# Patient Record
Sex: MALE | Race: OTHER | Employment: FULL TIME | URBAN - METROPOLITAN AREA
[De-identification: names, ages, dates, MRNs, and addresses within clinical notes are randomized per-mention and may not be internally consistent; named-entity substitution may affect disease eponyms.]

---

## 2019-06-21 ENCOUNTER — HOSPITAL ENCOUNTER (EMERGENCY)
Facility: CLINIC | Age: 34
Discharge: HOME | End: 2019-06-21
Attending: EMERGENCY MEDICINE
Payer: MEDICARE

## 2019-06-21 VITALS
WEIGHT: 173 LBS | OXYGEN SATURATION: 98 % | TEMPERATURE: 98 F | HEIGHT: 71 IN | SYSTOLIC BLOOD PRESSURE: 133 MMHG | RESPIRATION RATE: 20 BRPM | HEART RATE: 92 BPM | DIASTOLIC BLOOD PRESSURE: 86 MMHG | BODY MASS INDEX: 24.22 KG/M2

## 2019-06-21 DIAGNOSIS — L02.419 CELLULITIS AND ABSCESS OF LEG: Primary | ICD-10-CM

## 2019-06-21 DIAGNOSIS — L03.119 CELLULITIS AND ABSCESS OF LEG: Primary | ICD-10-CM

## 2019-06-21 PROCEDURE — 99282 EMERGENCY DEPT VISIT SF MDM: CPT

## 2019-06-21 PROCEDURE — 10060 I&D ABSCESS SIMPLE/SINGLE: CPT

## 2019-06-21 RX ORDER — SULFAMETHOXAZOLE AND TRIMETHOPRIM 800; 160 MG/1; MG/1
1 TABLET ORAL 2 TIMES DAILY
Qty: 20 TABLET | Refills: 0 | Status: SHIPPED | OUTPATIENT
Start: 2019-06-21 | End: 2019-07-01

## 2019-06-21 ASSESSMENT — PAIN DESCRIPTION - ORIENTATION: ORIENTATION: LEFT

## 2019-06-21 ASSESSMENT — PAIN DESCRIPTION - DESCRIPTORS: DESCRIPTORS: PENETRATING;SHOOTING

## 2019-06-21 ASSESSMENT — PAIN DESCRIPTION - PAIN TYPE: TYPE: ACUTE PAIN

## 2019-06-21 ASSESSMENT — PAIN DESCRIPTION - FREQUENCY: FREQUENCY: CONTINUOUS

## 2019-06-21 ASSESSMENT — ENCOUNTER SYMPTOMS: BACK PAIN: 0

## 2019-06-21 ASSESSMENT — PAIN SCALES - GENERAL: PAINLEVEL_OUTOF10: 8

## 2019-06-21 ASSESSMENT — PAIN DESCRIPTION - LOCATION: LOCATION: BUTTOCKS

## 2019-06-21 NOTE — ED NOTES
Dr Kenyetta Pino at bedside for I & D of left lower inner buttock fold abscess. Abscess approx the size of a quarter in circumference.       Era Lopez RN  06/21/19 8898

## 2019-06-21 NOTE — ED NOTES
Dr Porter Boots at bedside to evaluate. C/o abscess to left inner buttock fold 3-4 days. Denies drainage, states hx of same.       Kelby Hernandez RN  06/21/19 4848

## 2019-06-21 NOTE — ED PROVIDER NOTES
[facility] ED  [address] [facility] [address]  Phone: [phone] ED  eMERGENCY dEPARTMENT eNCOUnter      Pt Name: Ty Boudreaux  MRN: [id number]  [place] 1985  Date of evaluation: 6/21/2019  Provider: Jimy Willard DO    CHIEF COMPLAINT       Chief Complaint   Patient presents with    Abscess     left buttocks 3-4 days, denies drainage. denies fever. HISTORY OF PRESENT ILLNESS   (Location/Symptom, Timing/Onset,Context/Setting, Quality, Duration, Modifying Factors, Severity)  Note limiting factors. Ty Boudreaux is a 27 y.o. male who presents to the emergency department for the evaluation of an abscess on his left buttocks. Patient states this started about 3 or 4 days ago, it has been getting a little bit more swollen and a little bit more painful. He does have a history of an abscess in the past.  Patient is not diabetic. Patient denies fever or chills. Patient denies bowel or bladder incontinence     Nursing Notes were reviewed. REVIEW OF SYSTEMS    (2-9systems for level 4, 10 or more for level 5)     Review of Systems   Constitutional: Negative for fever. Genitourinary: Negative for dysuria. Musculoskeletal: Negative for back pain. Skin:        Lump on buttocks       Except asnoted above the remainder of the review of systems was reviewed and negative. PAST MEDICAL HISTORY   No past medical history on file. SURGICAL HISTORY     No past surgical history on file. CURRENT MEDICATIONS     Previous Medications    No medications on file       ALLERGIES     Motrin [ibuprofen]    FAMILY HISTORY     No family history on file.        SOCIAL HISTORY       Social History     Socioeconomic History    Marital status:      Spouse name: Not on file    Number of children: Not on file    Years of education: Not on file    Highest education level: Not on file   Occupational History    Not on file   Social Needs    Financial resource strain: Not on file    Food insecurity:     Worry: Not on file     Inability: Not on file    Transportation needs:     Medical: Not on file     Non-medical: Not on file   Tobacco Use    Smoking status: Current Every Day Smoker     Packs/day: 0.50     Types: Cigarettes   Substance and Sexual Activity    Alcohol use: No    Drug use: Never    Sexual activity: Not on file   Lifestyle    Physical activity:     Days per week: Not on file     Minutes per session: Not on file    Stress: Not on file   Relationships    Social connections:     Talks on phone: Not on file     Gets together: Not on file     Attends Mormon service: Not on file     Active member of club or organization: Not on file     Attends meetings of clubs or organizations: Not on file     Relationship status: Not on file    Intimate partner violence:     Fear of current or ex partner: Not on file     Emotionally abused: Not on file     Physically abused: Not on file     Forced sexual activity: Not on file   Other Topics Concern    Not on file   Social History Narrative    Not on file       SCREENINGS             PHYSICAL EXAM    (up to 7 for level 4, 8 or more for level 5)     ED Triage Vitals [06/21/19 1010]   BP Temp Temp Source Pulse Resp SpO2 Height Weight   133/86 98 °F (36.7 °C) Oral 92 20 98 % 5' 11\" (1.803 m) 173 lb (78.5 kg)       Physical Exam   Constitutional: He is oriented to person, place, and time. He appears well-developed and well-nourished. No distress. HENT:   Mouth/Throat: Oropharynx is clear and moist.   Eyes: Conjunctivae are normal. Right eye exhibits no discharge. Left eye exhibits no discharge. Neck: No tracheal deviation present. Cardiovascular: Normal rate, regular rhythm, normal heart sounds and intact distal pulses. No murmur heard. Pulmonary/Chest: Effort normal and breath sounds normal. No respiratory distress. He has no wheezes. Abdominal: Soft. He exhibits no distension.  There is no tenderness. There is no rebound. Genitourinary: Rectum normal.   Genitourinary Comments: There is an abscess, approximately they are diameter of a quarter on the left buttocks near the perianal region but not extending into the anus, rectal vault or immediate perineum. It is fluctuant with some surrounding erythema and minimal induration   Musculoskeletal: Normal range of motion. He exhibits no edema or deformity. Neurological: He is alert and oriented to person, place, and time. Answering all questions appropriately and moving all extremities   Skin: Skin is warm and dry. Capillary refill takes 2 to 3 seconds. No erythema. Psychiatric: He has a normal mood and affect. His behavior is normal.   Nursing note and vitals reviewed. EMERGENCY DEPARTMENT COURSE and DIFFERENTIAL DIAGNOSIS/MDM:   Vitals:    Vitals:    06/21/19 1010   BP: 133/86   Pulse: 92   Resp: 20   Temp: 98 °F (36.7 °C)   TempSrc: Oral   SpO2: 98%   Weight: 78.5 kg (173 lb)   Height: 5' 11\" (1.803 m)       Patient presents to the emergency department with the complaint described above. Vitals are grossly normal and he is nontoxic. Patient will need incision and drainage of abscess on the left gluteus, there is also cellulitis and he will need antibiotics      At this time the patient is without objective evidence of an acute process requiring hospitalization or inpatient management. They have remained hemodynamically stable throughout their entire ED visit and are stable for discharge with outpatient follow-up. Standard anticipatory guidance given to patient upon discharge. Have given them a specific time frame in which to follow-up and who to follow-up with. I have also advised them that they should return to the emergency department if they get worse, or not getting better or develop any new or concerning symptoms. Patient demonstrates understanding.              PROCEDURES:  Unless otherwise noted below, none Incision/Drainage  Date/Time: 6/21/2019 10:30 AM  Performed by: Bry Dickerson DO  Authorized by: Bry Dickerson DO     Consent:     Consent obtained:  Verbal    Consent given by:  Patient    Risks discussed:  Bleeding, incomplete drainage and pain  Location:     Type:  Abscess    Size:  4cm    Location:  Lower extremity    Lower extremity location:  Buttock    Buttock location:  L buttock  Pre-procedure details:     Skin preparation:  Betadine  Anesthesia (see MAR for exact dosages): Anesthesia method:  Local infiltration    Local anesthetic:  Lidocaine 1% WITH epi  Procedure type:     Complexity:  Complex  Procedure details:     Incision types:  Single straight    Incision depth:  Dermal    Scalpel blade:  11    Wound management:  Probed and deloculated, irrigated with saline and extensive cleaning    Drainage:  Purulent    Drainage amount: Moderate    Wound treatment:  Wound left open    Packing materials:  None  Post-procedure details:     Patient tolerance of procedure: Tolerated well, no immediate complications        FINAL IMPRESSION      1. Cellulitis and abscess of leg          DISPOSITION/PLAN   DISPOSITION Decision To Discharge 06/21/2019 10:26:55 AM      PATIENT REFERRED TO:  Your Primary Doctor  If you do not have a primary care physician or you are looking for a new physician, please contact the following number [phone] to establish one.   In 1 week        DISCHARGE MEDICATIONS:  New Prescriptions    SULFAMETHOXAZOLE-TRIMETHOPRIM (BACTRIM DS) 800-160 MG PER TABLET    Take 1 tablet by mouth 2 times daily for 10 days          (Please note that portions of this note were completed with a voice recognition program.  Efforts were made to edit the dictations but occasionally words are mis-transcribed.)    Bry Dcikerson DO (electronically signed)  Board Certified Emergency Physician         Bry Dickesron DO  06/21/19 1031

## 2019-07-17 ENCOUNTER — HOSPITAL ENCOUNTER (EMERGENCY)
Facility: CLINIC | Age: 34
Discharge: HOME | End: 2019-07-17
Attending: EMERGENCY MEDICINE
Payer: MEDICARE

## 2019-07-17 ENCOUNTER — APPOINTMENT (OUTPATIENT)
Dept: GENERAL RADIOLOGY | Facility: CLINIC | Age: 34
End: 2019-07-17
Payer: MEDICARE

## 2019-07-17 VITALS
HEART RATE: 69 BPM | RESPIRATION RATE: 22 BRPM | TEMPERATURE: 98.8 F | SYSTOLIC BLOOD PRESSURE: 104 MMHG | BODY MASS INDEX: 24.08 KG/M2 | HEIGHT: 71 IN | DIASTOLIC BLOOD PRESSURE: 71 MMHG | OXYGEN SATURATION: 100 % | WEIGHT: 172 LBS

## 2019-07-17 DIAGNOSIS — B34.9 VIRAL ILLNESS: ICD-10-CM

## 2019-07-17 DIAGNOSIS — R21 RASH AND OTHER NONSPECIFIC SKIN ERUPTION: ICD-10-CM

## 2019-07-17 DIAGNOSIS — T78.40XA ALLERGIC REACTION, INITIAL ENCOUNTER: Primary | ICD-10-CM

## 2019-07-17 LAB
-: NORMAL
ABSOLUTE EOS #: 0.2 K/UL (ref 0–0.4)
ABSOLUTE IMMATURE GRANULOCYTE: ABNORMAL K/UL (ref 0–0.3)
ABSOLUTE LYMPH #: 0.9 K/UL (ref 1–4.8)
ABSOLUTE MONO #: 0.5 K/UL (ref 0.1–1.2)
ALBUMIN SERPL-MCNC: 3.7 G/DL (ref 3.5–5.2)
ALBUMIN/GLOBULIN RATIO: 1.2 (ref 1–2.5)
ALP BLD-CCNC: 85 U/L (ref 40–129)
ALT SERPL-CCNC: 16 U/L (ref 5–41)
ANION GAP SERPL CALCULATED.3IONS-SCNC: 10 MMOL/L (ref 9–17)
AST SERPL-CCNC: 17 U/L
BASOPHILS # BLD: 0 % (ref 0–2)
BASOPHILS ABSOLUTE: 0 K/UL (ref 0–0.2)
BILIRUB SERPL-MCNC: 1.1 MG/DL (ref 0.3–1.2)
BUN BLDV-MCNC: 13 MG/DL (ref 6–20)
BUN/CREAT BLD: ABNORMAL (ref 9–20)
CALCIUM SERPL-MCNC: 8.4 MG/DL (ref 8.6–10.4)
CHLORIDE BLD-SCNC: 105 MMOL/L (ref 98–107)
CO2: 21 MMOL/L (ref 20–31)
CREAT SERPL-MCNC: 0.9 MG/DL (ref 0.7–1.2)
DIFFERENTIAL TYPE: ABNORMAL
EOSINOPHILS RELATIVE PERCENT: 2 % (ref 1–4)
GFR AFRICAN AMERICAN: >60 ML/MIN
GFR NON-AFRICAN AMERICAN: >60 ML/MIN
GFR SERPL CREATININE-BSD FRML MDRD: ABNORMAL ML/MIN/{1.73_M2}
GFR SERPL CREATININE-BSD FRML MDRD: ABNORMAL ML/MIN/{1.73_M2}
GLUCOSE BLD-MCNC: 110 MG/DL (ref 70–99)
HCT VFR BLD CALC: 45.1 % (ref 41–53)
HEMOGLOBIN: 15.3 G/DL (ref 13.5–17.5)
IMMATURE GRANULOCYTES: ABNORMAL %
LYMPHOCYTES # BLD: 7 % (ref 24–44)
MAGNESIUM: 1.5 MG/DL (ref 1.6–2.6)
MCH RBC QN AUTO: 29.5 PG (ref 26–34)
MCHC RBC AUTO-ENTMCNC: 33.8 G/DL (ref 31–37)
MCV RBC AUTO: 87.2 FL (ref 80–100)
MONOCYTES # BLD: 4 % (ref 2–11)
NRBC AUTOMATED: ABNORMAL PER 100 WBC
PDW BLD-RTO: 13 % (ref 12.5–15.4)
PLATELET # BLD: 250 K/UL (ref 140–450)
PLATELET ESTIMATE: ABNORMAL
PMV BLD AUTO: 8.2 FL (ref 6–12)
POTASSIUM SERPL-SCNC: 3.6 MMOL/L (ref 3.7–5.3)
RBC # BLD: 5.18 M/UL (ref 4.5–5.9)
RBC # BLD: ABNORMAL 10*6/UL
REASON FOR REJECTION: NORMAL
SEG NEUTROPHILS: 87 % (ref 36–66)
SEGMENTED NEUTROPHILS ABSOLUTE COUNT: 11.1 K/UL (ref 1.8–7.7)
SODIUM BLD-SCNC: 136 MMOL/L (ref 135–144)
TOTAL PROTEIN: 6.7 G/DL (ref 6.4–8.3)
TROPONIN INTERP: NORMAL
TROPONIN T: NORMAL NG/ML
TROPONIN, HIGH SENSITIVITY: <6 NG/L (ref 0–22)
WBC # BLD: 12.8 K/UL (ref 3.5–11)
WBC # BLD: ABNORMAL 10*3/UL
ZZ NTE CLEAN UP: ORDERED TEST: NORMAL
ZZ NTE WITH NAME CLEAN UP: SPECIMEN SOURCE: NORMAL

## 2019-07-17 PROCEDURE — 6360000002 HC RX W HCPCS: Performed by: EMERGENCY MEDICINE

## 2019-07-17 PROCEDURE — 36415 COLL VENOUS BLD VENIPUNCTURE: CPT

## 2019-07-17 PROCEDURE — 80053 COMPREHEN METABOLIC PANEL: CPT

## 2019-07-17 PROCEDURE — 2500000003 HC RX 250 WO HCPCS: Performed by: EMERGENCY MEDICINE

## 2019-07-17 PROCEDURE — 96375 TX/PRO/DX INJ NEW DRUG ADDON: CPT

## 2019-07-17 PROCEDURE — 99285 EMERGENCY DEPT VISIT HI MDM: CPT

## 2019-07-17 PROCEDURE — 85025 COMPLETE CBC W/AUTO DIFF WBC: CPT

## 2019-07-17 PROCEDURE — 71046 X-RAY EXAM CHEST 2 VIEWS: CPT

## 2019-07-17 PROCEDURE — 93005 ELECTROCARDIOGRAM TRACING: CPT | Performed by: EMERGENCY MEDICINE

## 2019-07-17 PROCEDURE — 83735 ASSAY OF MAGNESIUM: CPT

## 2019-07-17 PROCEDURE — 84484 ASSAY OF TROPONIN QUANT: CPT

## 2019-07-17 PROCEDURE — 96372 THER/PROPH/DIAG INJ SC/IM: CPT

## 2019-07-17 PROCEDURE — 96374 THER/PROPH/DIAG INJ IV PUSH: CPT

## 2019-07-17 PROCEDURE — 2580000003 HC RX 258: Performed by: EMERGENCY MEDICINE

## 2019-07-17 RX ORDER — METHYLPREDNISOLONE SODIUM SUCCINATE 125 MG/2ML
125 INJECTION, POWDER, LYOPHILIZED, FOR SOLUTION INTRAMUSCULAR; INTRAVENOUS ONCE
Status: COMPLETED | OUTPATIENT
Start: 2019-07-17 | End: 2019-07-17

## 2019-07-17 RX ORDER — 0.9 % SODIUM CHLORIDE 0.9 %
1000 INTRAVENOUS SOLUTION INTRAVENOUS ONCE
Status: COMPLETED | OUTPATIENT
Start: 2019-07-17 | End: 2019-07-17

## 2019-07-17 RX ORDER — PREDNISONE 10 MG/1
TABLET ORAL
Qty: 20 TABLET | Refills: 0 | Status: SHIPPED | OUTPATIENT
Start: 2019-07-17 | End: 2019-07-27

## 2019-07-17 RX ORDER — EPINEPHRINE 1 MG/ML
0.3 INJECTION, SOLUTION, CONCENTRATE INTRAVENOUS ONCE
Status: COMPLETED | OUTPATIENT
Start: 2019-07-17 | End: 2019-07-17

## 2019-07-17 RX ORDER — DIPHENHYDRAMINE HYDROCHLORIDE 50 MG/ML
50 INJECTION INTRAMUSCULAR; INTRAVENOUS ONCE
Status: COMPLETED | OUTPATIENT
Start: 2019-07-17 | End: 2019-07-17

## 2019-07-17 RX ADMIN — FAMOTIDINE 40 MG: 10 INJECTION, SOLUTION INTRAVENOUS at 09:08

## 2019-07-17 RX ADMIN — DIPHENHYDRAMINE HYDROCHLORIDE 50 MG: 50 INJECTION, SOLUTION INTRAMUSCULAR; INTRAVENOUS at 09:07

## 2019-07-17 RX ADMIN — SODIUM CHLORIDE 1000 ML: 9 INJECTION, SOLUTION INTRAVENOUS at 09:07

## 2019-07-17 RX ADMIN — METHYLPREDNISOLONE SODIUM SUCCINATE 125 MG: 125 INJECTION, POWDER, FOR SOLUTION INTRAMUSCULAR; INTRAVENOUS at 09:07

## 2019-07-17 RX ADMIN — EPINEPHRINE 0.3 MG: 1 INJECTION INTRAMUSCULAR; INTRAVENOUS; SUBCUTANEOUS at 09:11

## 2019-07-17 ASSESSMENT — ENCOUNTER SYMPTOMS
WHEEZING: 0
NAUSEA: 0
VOMITING: 0
DIARRHEA: 0
COUGH: 0
SORE THROAT: 0
ABDOMINAL PAIN: 0

## 2019-07-17 ASSESSMENT — PAIN DESCRIPTION - LOCATION: LOCATION: CHEST

## 2019-07-17 ASSESSMENT — PAIN DESCRIPTION - DESCRIPTORS: DESCRIPTORS: SORE;TIGHTNESS

## 2019-07-17 ASSESSMENT — PAIN DESCRIPTION - ORIENTATION: ORIENTATION: ANTERIOR

## 2019-07-17 ASSESSMENT — PAIN DESCRIPTION - FREQUENCY: FREQUENCY: CONTINUOUS

## 2019-07-17 ASSESSMENT — PAIN SCALES - GENERAL: PAINLEVEL_OUTOF10: 7

## 2019-07-17 NOTE — ED TRIAGE NOTES
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 Denies exposure to new detergents or soap. C/o itching. C/o chest heaviness since yesterday, constant \"like it is super tight and I can't even cough\". C/o temp of \"103\" yesterday, chills. C/o constipation since yesterday.  Denies n/v.

## 2019-07-18 LAB
EKG ATRIAL RATE: 69 BPM
EKG P AXIS: 67 DEGREES
EKG P-R INTERVAL: 146 MS
EKG Q-T INTERVAL: 406 MS
EKG QRS DURATION: 84 MS
EKG QTC CALCULATION (BAZETT): 435 MS
EKG R AXIS: 93 DEGREES
EKG T AXIS: 40 DEGREES
EKG VENTRICULAR RATE: 69 BPM

## 2019-10-22 ENCOUNTER — HOSPITAL ENCOUNTER (EMERGENCY)
Facility: CLINIC | Age: 34
Discharge: HOME | End: 2019-10-22
Attending: EMERGENCY MEDICINE
Payer: MEDICARE

## 2019-10-22 ENCOUNTER — APPOINTMENT (OUTPATIENT)
Dept: GENERAL RADIOLOGY | Facility: CLINIC | Age: 34
End: 2019-10-22
Payer: MEDICARE

## 2019-10-22 VITALS
HEART RATE: 92 BPM | OXYGEN SATURATION: 98 % | HEIGHT: 71 IN | SYSTOLIC BLOOD PRESSURE: 118 MMHG | BODY MASS INDEX: 24.64 KG/M2 | WEIGHT: 176 LBS | TEMPERATURE: 98.7 F | RESPIRATION RATE: 15 BRPM | DIASTOLIC BLOOD PRESSURE: 66 MMHG

## 2019-10-22 DIAGNOSIS — J18.9 PNEUMONIA DUE TO ORGANISM: Primary | ICD-10-CM

## 2019-10-22 LAB
DIRECT EXAM: NORMAL
Lab: NORMAL
SPECIMEN DESCRIPTION: NORMAL

## 2019-10-22 PROCEDURE — 87804 INFLUENZA ASSAY W/OPTIC: CPT

## 2019-10-22 PROCEDURE — 2580000003 HC RX 258: Performed by: EMERGENCY MEDICINE

## 2019-10-22 PROCEDURE — 99284 EMERGENCY DEPT VISIT MOD MDM: CPT

## 2019-10-22 PROCEDURE — 71046 X-RAY EXAM CHEST 2 VIEWS: CPT

## 2019-10-22 PROCEDURE — 6370000000 HC RX 637 (ALT 250 FOR IP): Performed by: EMERGENCY MEDICINE

## 2019-10-22 RX ORDER — GUAIFENESIN 600 MG/1
600 TABLET, EXTENDED RELEASE ORAL 2 TIMES DAILY
Qty: 20 TABLET | Refills: 0 | Status: SHIPPED | OUTPATIENT
Start: 2019-10-22 | End: 2020-09-17

## 2019-10-22 RX ORDER — 0.9 % SODIUM CHLORIDE 0.9 %
1000 INTRAVENOUS SOLUTION INTRAVENOUS ONCE
Status: COMPLETED | OUTPATIENT
Start: 2019-10-22 | End: 2019-10-22

## 2019-10-22 RX ORDER — ACETAMINOPHEN 325 MG/1
650 TABLET ORAL ONCE
Status: COMPLETED | OUTPATIENT
Start: 2019-10-22 | End: 2019-10-22

## 2019-10-22 RX ORDER — AZITHROMYCIN 250 MG/1
TABLET, FILM COATED ORAL
Qty: 1 PACKET | Refills: 0 | Status: SHIPPED | OUTPATIENT
Start: 2019-10-22 | End: 2019-11-01

## 2019-10-22 RX ORDER — BENZONATATE 100 MG/1
100 CAPSULE ORAL 3 TIMES DAILY PRN
Qty: 30 CAPSULE | Refills: 0 | Status: SHIPPED | OUTPATIENT
Start: 2019-10-22 | End: 2019-10-29

## 2019-10-22 RX ADMIN — SODIUM CHLORIDE 1000 ML: 9 INJECTION, SOLUTION INTRAVENOUS at 09:38

## 2019-10-22 RX ADMIN — ACETAMINOPHEN 650 MG: 325 TABLET ORAL at 09:38

## 2019-10-22 ASSESSMENT — PAIN DESCRIPTION - DESCRIPTORS: DESCRIPTORS: ACHING

## 2019-10-22 ASSESSMENT — PAIN DESCRIPTION - PAIN TYPE: TYPE: ACUTE PAIN

## 2019-10-22 ASSESSMENT — PAIN SCALES - GENERAL
PAINLEVEL_OUTOF10: 7
PAINLEVEL_OUTOF10: 7

## 2019-10-22 ASSESSMENT — PAIN SCALES - WONG BAKER: WONGBAKER_NUMERICALRESPONSE: 6

## 2019-10-22 ASSESSMENT — PAIN DESCRIPTION - LOCATION: LOCATION: GENERALIZED

## 2020-09-17 ENCOUNTER — APPOINTMENT (OUTPATIENT)
Dept: GENERAL RADIOLOGY | Facility: CLINIC | Age: 35
End: 2020-09-17
Payer: MEDICARE

## 2020-09-17 ENCOUNTER — HOSPITAL ENCOUNTER (EMERGENCY)
Facility: CLINIC | Age: 35
Discharge: HOME | End: 2020-09-17
Attending: EMERGENCY MEDICINE
Payer: MEDICARE

## 2020-09-17 VITALS
HEART RATE: 67 BPM | HEIGHT: 71 IN | OXYGEN SATURATION: 97 % | BODY MASS INDEX: 24.78 KG/M2 | DIASTOLIC BLOOD PRESSURE: 78 MMHG | SYSTOLIC BLOOD PRESSURE: 123 MMHG | TEMPERATURE: 97.7 F | WEIGHT: 177 LBS | RESPIRATION RATE: 16 BRPM

## 2020-09-17 PROCEDURE — 96374 THER/PROPH/DIAG INJ IV PUSH: CPT

## 2020-09-17 PROCEDURE — 99283 EMERGENCY DEPT VISIT LOW MDM: CPT

## 2020-09-17 PROCEDURE — 6370000000 HC RX 637 (ALT 250 FOR IP): Performed by: EMERGENCY MEDICINE

## 2020-09-17 PROCEDURE — 73562 X-RAY EXAM OF KNEE 3: CPT

## 2020-09-17 PROCEDURE — 6360000002 HC RX W HCPCS: Performed by: EMERGENCY MEDICINE

## 2020-09-17 RX ORDER — ONDANSETRON 4 MG/1
4 TABLET, ORALLY DISINTEGRATING ORAL ONCE
Status: COMPLETED | OUTPATIENT
Start: 2020-09-17 | End: 2020-09-17

## 2020-09-17 RX ORDER — FENTANYL CITRATE 50 UG/ML
50 INJECTION, SOLUTION INTRAMUSCULAR; INTRAVENOUS ONCE
Status: COMPLETED | OUTPATIENT
Start: 2020-09-17 | End: 2020-09-17

## 2020-09-17 RX ORDER — HYDROCODONE BITARTRATE AND ACETAMINOPHEN 5; 325 MG/1; MG/1
1 TABLET ORAL EVERY 6 HOURS PRN
Qty: 10 TABLET | Refills: 0 | Status: SHIPPED | OUTPATIENT
Start: 2020-09-17 | End: 2020-09-20

## 2020-09-17 RX ADMIN — ONDANSETRON 4 MG: 4 TABLET, ORALLY DISINTEGRATING ORAL at 09:27

## 2020-09-17 RX ADMIN — FENTANYL CITRATE 50 MCG: 50 INJECTION, SOLUTION INTRAMUSCULAR; INTRAVENOUS at 09:27

## 2020-09-17 ASSESSMENT — ENCOUNTER SYMPTOMS
BACK PAIN: 0
WHEEZING: 0
DIARRHEA: 0
SORE THROAT: 0
NAUSEA: 0
SHORTNESS OF BREATH: 0
CONSTIPATION: 0
VOMITING: 0
ABDOMINAL PAIN: 0

## 2020-09-17 ASSESSMENT — PAIN DESCRIPTION - PAIN TYPE
TYPE: ACUTE PAIN
TYPE: ACUTE PAIN

## 2020-09-17 ASSESSMENT — PAIN DESCRIPTION - ONSET: ONSET: SUDDEN

## 2020-09-17 ASSESSMENT — PAIN DESCRIPTION - LOCATION: LOCATION: KNEE

## 2020-09-17 ASSESSMENT — PAIN DESCRIPTION - DESCRIPTORS: DESCRIPTORS: ACHING

## 2020-09-17 ASSESSMENT — PAIN DESCRIPTION - PROGRESSION
CLINICAL_PROGRESSION: RAPIDLY WORSENING
CLINICAL_PROGRESSION: GRADUALLY IMPROVING

## 2020-09-17 ASSESSMENT — PAIN - FUNCTIONAL ASSESSMENT: PAIN_FUNCTIONAL_ASSESSMENT: PREVENTS OR INTERFERES WITH ALL ACTIVE AND SOME PASSIVE ACTIVITIES

## 2020-09-17 ASSESSMENT — PAIN SCALES - GENERAL
PAINLEVEL_OUTOF10: 8
PAINLEVEL_OUTOF10: 8
PAINLEVEL_OUTOF10: 2

## 2020-09-17 ASSESSMENT — PAIN DESCRIPTION - FREQUENCY: FREQUENCY: CONTINUOUS

## 2020-09-17 ASSESSMENT — PAIN DESCRIPTION - ORIENTATION: ORIENTATION: LEFT

## 2020-09-17 NOTE — ED PROVIDER NOTES
[facility]  [address] [facility]  Phone: [phone]        Pt Name: Kate Diallo  MRN: [id number]  [place] 1985  Date of evaluation: 9/17/20      CHIEF COMPLAINT       Chief Complaint   Patient presents with    Knee Pain     Left         HISTORY OF PRESENT ILLNESS    Kate Diallo is a 38 y.o. male who presents chief complaint of left knee pain he says is had problems with it going out in the past he just not down today and as he stood up it popped and he is not been able to fully extend since no pain in the hip or ankle patient states sometimes he can just hang the leg and it will pop back in this time he is not been able to there is been no systemic symptoms. Pain is worse with any attempted movement      REVIEW OF SYSTEMS         Review of Systems   Constitutional: Negative for chills and fever. HENT: Negative for congestion, dental problem and sore throat. Respiratory: Negative for shortness of breath and wheezing. Cardiovascular: Negative for chest pain, palpitations and leg swelling. Gastrointestinal: Negative for abdominal pain, constipation, diarrhea, nausea and vomiting. Musculoskeletal: Negative for back pain, joint swelling and neck pain. Left knee pain as described   Skin: Negative for rash. Neurological: Negative for dizziness, syncope, weakness and headaches. Hematological: Negative for adenopathy. Does not bruise/bleed easily. Psychiatric/Behavioral: Negative for confusion and suicidal ideas. PAST MEDICAL HISTORY    has a past medical history of Heart murmur. SURGICAL HISTORY      has no past surgical history on file. CURRENT MEDICATIONS       Previous Medications    No medications on file       ALLERGIES     is allergic to motrin [ibuprofen]. FAMILY HISTORY     has no family status information on file. family history is not on file. SOCIAL HISTORY      reports that he has been smoking cigarettes.  He has been smoking about 0.50 packs per day. He has never used smokeless tobacco. He reports that he does not drink alcohol or use drugs. PHYSICAL EXAM     INITIAL VITALS:  height is 5' 11\" (1.803 m) and weight is 80.3 kg (177 lb). His oral temperature is 97.7 °F (36.5 °C). His blood pressure is 123/78 and his pulse is 67. His respiration is 16 and oxygen saturation is 97%. Physical Exam  Constitutional:       Appearance: He is well-developed. HENT:      Head: Normocephalic and atraumatic. Right Ear: External ear normal.      Left Ear: External ear normal.   Eyes:      Pupils: Pupils are equal, round, and reactive to light. Neck:      Musculoskeletal: Normal range of motion and neck supple. Cardiovascular:      Rate and Rhythm: Normal rate and regular rhythm. Pulmonary:      Effort: Pulmonary effort is normal.      Breath sounds: Normal breath sounds. Musculoskeletal: Normal range of motion. Comments: Patient has his left knee partially flexed he is resistant to full extension there is no obvious bony deformity the patella is midline he is tender along the medial joint line there is no obvious effusion   Skin:     General: Skin is warm and dry. Neurological:      Mental Status: He is alert and oriented to person, place, and time.    Psychiatric:         Behavior: Behavior normal.           DIFFERENTIAL DIAGNOSIS/ MDM:     Left knee injury unable to extend concern for possible meniscal injury, or free body will obtain x-rays    DIAGNOSTIC RESULTS     EKG: All EKG's are interpreted by the Emergency Department Physician who either signs or Co-signs this chart in the absence of a cardiologist.        RADIOLOGY:   Non-plain film images such as CT, Ultrasound and MRI are read by the radiologist. Plain radiographic images are visualized and the radiologist interpretations are reviewed as follows:        EXAMINATION:    THREE XRAY VIEWS OF THE LEFT KNEE         9/17/2020 9:43 am         COMPARISON:

## 2020-09-21 ENCOUNTER — OFFICE VISIT (OUTPATIENT)
Dept: ORTHOPEDIC SURGERY | Age: 35
End: 2020-09-21
Payer: MEDICARE

## 2020-09-21 ENCOUNTER — HOSPITAL ENCOUNTER (OUTPATIENT)
Dept: MRI IMAGING | Facility: CLINIC | Age: 35
Discharge: HOME | End: 2020-09-23
Payer: MEDICARE

## 2020-09-21 VITALS
SYSTOLIC BLOOD PRESSURE: 124 MMHG | TEMPERATURE: 97.2 F | WEIGHT: 177 LBS | DIASTOLIC BLOOD PRESSURE: 77 MMHG | HEART RATE: 82 BPM | BODY MASS INDEX: 24.78 KG/M2 | HEIGHT: 71 IN

## 2020-09-21 PROCEDURE — G8420 CALC BMI NORM PARAMETERS: HCPCS | Performed by: ORTHOPAEDIC SURGERY

## 2020-09-21 PROCEDURE — 73721 MRI JNT OF LWR EXTRE W/O DYE: CPT

## 2020-09-21 PROCEDURE — 4004F PT TOBACCO SCREEN RCVD TLK: CPT | Performed by: ORTHOPAEDIC SURGERY

## 2020-09-21 PROCEDURE — 99203 OFFICE O/P NEW LOW 30 MIN: CPT | Performed by: ORTHOPAEDIC SURGERY

## 2020-09-21 PROCEDURE — G8427 DOCREV CUR MEDS BY ELIG CLIN: HCPCS | Performed by: ORTHOPAEDIC SURGERY

## 2020-09-21 ASSESSMENT — ENCOUNTER SYMPTOMS
VOICE CHANGE: 0
ABDOMINAL DISTENTION: 0
NAUSEA: 0
SORE THROAT: 0
SHORTNESS OF BREATH: 0
ABDOMINAL PAIN: 0
CONSTIPATION: 0
APNEA: 0
PHOTOPHOBIA: 0
COLOR CHANGE: 0
VOMITING: 0
DIARRHEA: 0
EYE DISCHARGE: 0
CHEST TIGHTNESS: 0
COUGH: 0

## 2020-09-21 NOTE — PROGRESS NOTES
[facility] [address] [facility]  [address]  [address] [facility] [address]  Dept: [phone]  Dept Fax: [phone]          Left Knee - New Patient     Subjective:     Chief Complaint   Patient presents with    Knee Pain     left knee strain, STA DOI- 9/17/20     HPI:     Lynne Hernandez presents today for Left knee pain. The pain has been present for 4 days. The patient recalls a specific injury where he felt as though the knee cap dislocated. This has been a recurring injury since high school. He had his cleat planting playing football and someone hit him high and someone hit him low and the knee dislocated. Since then, the patient has recurrently dislocated his knee cap every year nearly. The patient has tried Norco which was prescribed at the ED department with moderate improvement. The pain is now described as Stabbing  and Sharp when moving, but achy and dull when waking up. Normally he takes tylenol when it slips out for a couple weeks. Additionally, the dislocation is usually only momentary and then he takes it easy for a couple weeks. He is normally able to walk on it as well, with almost full range of motion -- save some limited extension. Today, he feels as though instead of dislocating and relocation, the knee cap is still out. He cannot fully extend it. There is pain on weight bearing, and he feels he is unable to stand because he cannot fully extend it. The knee has swelled. There is  popping and clicking but it is not painful. The knee has caught or locked up. The knee has given out multiple times resulting in him hitting the ground. It is stiff upon arising from sitting. It is  painful to go up and down stairs and sit for a prolonged time. The patient has not had a cortisone injection. The patient has not tried a lubrication injection. The patient has not tried physical therapy. The patient has not had surgery.      ROS:   Review of Systems Constitutional: Positive for activity change. Negative for appetite change and unexpected weight change. HENT: Negative for congestion, hearing loss, sore throat and voice change. Eyes: Negative for photophobia and discharge. Respiratory: Negative for apnea, cough, chest tightness and shortness of breath. Cardiovascular: Negative for chest pain, palpitations and leg swelling. Gastrointestinal: Negative for abdominal distention, abdominal pain, constipation, diarrhea, nausea and vomiting. Genitourinary: Negative for difficulty urinating and dysuria. Musculoskeletal: Positive for arthralgias, gait problem, joint swelling and myalgias. Skin: Negative for color change, rash and wound. Neurological: Negative for facial asymmetry and speech difficulty. Psychiatric/Behavioral: Negative for sleep disturbance. Past Medical History:    Past Medical History:   Diagnosis Date    Heart murmur        Past Surgical History:    Past Surgical History:   Procedure Laterality Date    ARM SURGERY Left 1998    Repair fracture    KNEE ARTHROSCOPY Left 9/22/2020    LEFT KNEE  ARTHROSCOPY WITH MEDIAL MENISCAL REPAIR AND ACL RECONSTRUCTION performed by Alba Wilkinson DO at [address]:   Current Outpatient Medications   Medication Sig Dispense Refill    acetaminophen (TYLENOL) 500 MG tablet Take 500 mg by mouth every 6 hours as needed for Pain      PERCOCET 5-325 MG per tablet Take 1 tablet by mouth every 4 hours as needed for Pain for up to 7 days. Decrease usage over the post operative course. 42 tablet 0     No current facility-administered medications for this visit.         Allergies:    Motrin [ibuprofen]    Social History:   Social History     Socioeconomic History    Marital status:      Spouse name: None    Number of children: None    Years of education: None    Highest education level: None   Occupational History    None   Social Needs    Financial resource strain: None    Food insecurity     Worry: None     Inability: None    Transportation needs     Medical: None     Non-medical: None   Tobacco Use    Smoking status: Current Every Day Smoker     Packs/day: 0.50     Types: Cigarettes    Smokeless tobacco: Never Used   Substance and Sexual Activity    Alcohol use: No    Drug use: Never    Sexual activity: None   Lifestyle    Physical activity     Days per week: None     Minutes per session: None    Stress: None   Relationships    Social connections     Talks on phone: None     Gets together: None     Attends Orthodox service: None     Active member of club or organization: None     Attends meetings of clubs or organizations: None     Relationship status: None    Intimate partner violence     Fear of current or ex partner: None     Emotionally abused: None     Physically abused: None     Forced sexual activity: None   Other Topics Concern    None   Social History Narrative    None       Family History:  No family history on file. Vitals:   /77   Pulse 82   Temp 97.2 °F (36.2 °C)   Ht 5' 11\" (1.803 m)   Wt 177 lb (80.3 kg)   BMI 24.69 kg/m²  Body mass index is 24.69 kg/m². Physical Examination:     Orthopedics:    GENERAL: Alert and oriented X3 in no acute distress. SKIN: Intact without lesions or ulcerations. NEURO: Intact to sensory and motor testing. VASC: Capillary refill is less than 3 seconds. KNEE EXAM    LOCATION: Left Knee  GEN: Alert and oriented X 3, in no acute distress. GAIT: The patient's gait was observed while entering the exam room and was noted to be antalgic. The extremity is in anatomic alignment. SKIN: Intact without rashes, lesions, or ulcerations. No obvious deformity or swelling. NEURO: The patient responds to light touch throughout left LE. Patellar and Achilles reflexes are 2/4. VASC: The left LE is neurovascularly intact with 2/4 DP and 2/4 PT pulses. Brisk capillary refill. ROM: 80/114 degrees.  There is mild effusion. MUSC: good quad tone  LIGAMENT: Lachman's test is Negative with Good endpoint. Anterior drawer Negative. Posterior drawer Negative. There is No varus instability at 0 degrees and No varus instability at 30 degrees. There is No valgus instability at 0 degrees and No valgus instability at 30 degrees. SPECIAL: Yohannes test is positive with 0 clunks, 0 crepitation, and + pain. PALP: There is medial joint line pain. Assessment:     1. Acute medial meniscus tear of left knee, initial encounter      Procedures:    Procedure: no  Radiology:   No results found. Plan:   Treatment : I reviewed the X-ray with the patient and I informed them that the patient most likely has a medial meniscus tear. We discussed the etiologies and natural histories of medial meniscus tears, especially ones of chronic nature. We discussed the various treatment alternatives including anti-inflammatory medications, physical therapy, injections, further imaging studies and as a last result surgery. During today's visit, we discussed the patient's overall prognosis for his medial meniscus tear and his likelihood to benefit from surgery over other treatment modalities. Given the recurrent nature of his injury, he is unlikely to have resolution of his injury from nonoperative treatment modalities alone. He likely has had this meniscus tear for 19 years, with periodic catching of the torn meniscus, however this time a portion of his meniscus tear has likely become incarcerated between the tibial and femoral condyles. This would explain his severe pain and his inability to extend the knee. An MRI would help to elucidate other reasons the patient may be unable to extend the knee and provide insight as to the nature of his meniscal tear if present. The patient has opted for a stat MRI with the intention of undergoing definitive surgical management tomorrow or Wednesday.  The patient was instructed on the need for preoperative cleansing with suzie, the need for a preoperative COVID test, and the need to avoid PO intake after midnight the day of his surgery as these can cause morbidity or surgery cancellation. The patient will likely benefit from bracing, physical therapy, and pain medication postoperatively. The patient was consented for medial meniscus repair versus menisectomy and will schedule with the surgery scheduler. A physical therapy prescription was not given. A Rx was not given. Patient should return to the clinic in post operatively to follow up with Tony Caicedo D.O. . The patient will call the office immediately with any problems. No orders of the defined types were placed in this encounter. Orders Placed This Encounter   Procedures    MRI KNEE LEFT WO CONTRAST     Standing Status:   Future     Number of Occurrences:   1     Standing Expiration Date:   9/21/2021       ICampos MD, am scribing for and in the presence of Tony Caicedo D.O.. 9/27/2020  4:40 PM      Tony JAMES DO, have personally seen this patient and I have reviewed the CC, PMH, FHX and Social History as provided by other clinical staff. I reassessed the HPI and ROS as scribed by Antonina Cuevas MD in my presence and it is both accurate and complete. Thereafter, I personally performed the PE, reviewed the imaging and established the DX and POC. I agree with the documentation provided by the Resident Physician. I have reviewed all documentation in its entirety prior to providing my signature indicating agreement. Any areas of disagreement are noted on the chart.     Electronically signed by Elroy Jacques DO on 9/27/2020 at 4:40 PM        Electronically signed by Elroy Jacques DO, on 9/27/2020 at 4:40 PM

## 2020-09-22 ENCOUNTER — HOSPITAL ENCOUNTER (OUTPATIENT)
Age: 35
Setting detail: OUTPATIENT SURGERY
Discharge: HOME | End: 2020-09-22
Attending: ORTHOPAEDIC SURGERY | Admitting: ORTHOPAEDIC SURGERY
Payer: MEDICARE

## 2020-09-22 ENCOUNTER — ANESTHESIA (OUTPATIENT)
Dept: OPERATING ROOM | Age: 35
End: 2020-09-22
Payer: MEDICARE

## 2020-09-22 ENCOUNTER — ANESTHESIA EVENT (OUTPATIENT)
Dept: OPERATING ROOM | Age: 35
End: 2020-09-22
Payer: MEDICARE

## 2020-09-22 VITALS
BODY MASS INDEX: 24.78 KG/M2 | WEIGHT: 177 LBS | DIASTOLIC BLOOD PRESSURE: 77 MMHG | TEMPERATURE: 97.3 F | HEART RATE: 81 BPM | HEIGHT: 71 IN | OXYGEN SATURATION: 95 % | RESPIRATION RATE: 24 BRPM | SYSTOLIC BLOOD PRESSURE: 120 MMHG

## 2020-09-22 VITALS
RESPIRATION RATE: 16 BRPM | DIASTOLIC BLOOD PRESSURE: 53 MMHG | TEMPERATURE: 97.7 F | OXYGEN SATURATION: 100 % | SYSTOLIC BLOOD PRESSURE: 108 MMHG

## 2020-09-22 LAB
SARS-COV-2, RAPID: NOT DETECTED
SARS-COV-2: NORMAL
SARS-COV-2: NORMAL
SOURCE: NORMAL

## 2020-09-22 PROCEDURE — 2580000003 HC RX 258: Performed by: ANESTHESIOLOGY

## 2020-09-22 PROCEDURE — 2500000003 HC RX 250 WO HCPCS: Performed by: ANESTHESIOLOGY

## 2020-09-22 PROCEDURE — C1776 JOINT DEVICE (IMPLANTABLE): HCPCS | Performed by: ORTHOPAEDIC SURGERY

## 2020-09-22 PROCEDURE — U0002 COVID-19 LAB TEST NON-CDC: HCPCS

## 2020-09-22 PROCEDURE — 2500000003 HC RX 250 WO HCPCS: Performed by: NURSE ANESTHETIST, CERTIFIED REGISTERED

## 2020-09-22 PROCEDURE — 2720000010 HC SURG SUPPLY STERILE: Performed by: ORTHOPAEDIC SURGERY

## 2020-09-22 PROCEDURE — 7100000011 HC PHASE II RECOVERY - ADDTL 15 MIN: Performed by: ORTHOPAEDIC SURGERY

## 2020-09-22 PROCEDURE — 3600000003 HC SURGERY LEVEL 3 BASE: Performed by: ORTHOPAEDIC SURGERY

## 2020-09-22 PROCEDURE — 2580000003 HC RX 258: Performed by: NURSE ANESTHETIST, CERTIFIED REGISTERED

## 2020-09-22 PROCEDURE — 6370000000 HC RX 637 (ALT 250 FOR IP): Performed by: ANESTHESIOLOGY

## 2020-09-22 PROCEDURE — 3600000013 HC SURGERY LEVEL 3 ADDTL 15MIN: Performed by: ORTHOPAEDIC SURGERY

## 2020-09-22 PROCEDURE — 3700000001 HC ADD 15 MINUTES (ANESTHESIA): Performed by: ORTHOPAEDIC SURGERY

## 2020-09-22 PROCEDURE — 64447 NJX AA&/STRD FEMORAL NRV IMG: CPT | Performed by: ANESTHESIOLOGY

## 2020-09-22 PROCEDURE — 6360000002 HC RX W HCPCS: Performed by: ANESTHESIOLOGY

## 2020-09-22 PROCEDURE — 2709999900 HC NON-CHARGEABLE SUPPLY: Performed by: ORTHOPAEDIC SURGERY

## 2020-09-22 PROCEDURE — 7100000010 HC PHASE II RECOVERY - FIRST 15 MIN: Performed by: ORTHOPAEDIC SURGERY

## 2020-09-22 PROCEDURE — 6360000002 HC RX W HCPCS: Performed by: ORTHOPAEDIC SURGERY

## 2020-09-22 PROCEDURE — 29888 ARTHRS AID ACL RPR/AGMNTJ: CPT | Performed by: ORTHOPAEDIC SURGERY

## 2020-09-22 PROCEDURE — 6360000002 HC RX W HCPCS: Performed by: NURSE ANESTHETIST, CERTIFIED REGISTERED

## 2020-09-22 PROCEDURE — 7100000000 HC PACU RECOVERY - FIRST 15 MIN: Performed by: ORTHOPAEDIC SURGERY

## 2020-09-22 PROCEDURE — L1851 KO SINGLE UPRIGHT PREFAB OTS: HCPCS | Performed by: ORTHOPAEDIC SURGERY

## 2020-09-22 PROCEDURE — 29882 ARTHRS KNE SRG MNISC RPR M/L: CPT | Performed by: ORTHOPAEDIC SURGERY

## 2020-09-22 PROCEDURE — 3700000000 HC ANESTHESIA ATTENDED CARE: Performed by: ORTHOPAEDIC SURGERY

## 2020-09-22 PROCEDURE — 7100000001 HC PACU RECOVERY - ADDTL 15 MIN: Performed by: ORTHOPAEDIC SURGERY

## 2020-09-22 PROCEDURE — C1713 ANCHOR/SCREW BN/BN,TIS/BN: HCPCS | Performed by: ORTHOPAEDIC SURGERY

## 2020-09-22 DEVICE — ANCHOR SUTURE BIOCOMP 4.75X19.1 MM SWIVELOCK C: Type: IMPLANTABLE DEVICE | Site: KNEE | Status: FUNCTIONAL

## 2020-09-22 DEVICE — BUTTON FIX FOR ACL RECON W/ TI AND UHMWPE TIGHTROPE: Type: IMPLANTABLE DEVICE | Site: KNEE | Status: FUNCTIONAL

## 2020-09-22 DEVICE — BUTTON FIX L14MM CLLR 7MM RND CONCAVE TWO PC FOR ACL RECON: Type: IMPLANTABLE DEVICE | Site: KNEE | Status: FUNCTIONAL

## 2020-09-22 DEVICE — DEVICE MENIS CRV IMPL JUGGER STIT DISP: Type: IMPLANTABLE DEVICE | Site: KNEE | Status: FUNCTIONAL

## 2020-09-22 DEVICE — BUTTON FIX UHMWPE ATTCH SYS FOR ACL RECON TIGHTROPE: Type: IMPLANTABLE DEVICE | Site: KNEE | Status: FUNCTIONAL

## 2020-09-22 RX ORDER — DEXAMETHASONE SODIUM PHOSPHATE 10 MG/ML
INJECTION, SOLUTION INTRAMUSCULAR; INTRAVENOUS PRN
Status: DISCONTINUED | OUTPATIENT
Start: 2020-09-22 | End: 2020-09-22 | Stop reason: SDUPTHER

## 2020-09-22 RX ORDER — LIDOCAINE HYDROCHLORIDE 20 MG/ML
INJECTION, SOLUTION EPIDURAL; INFILTRATION; INTRACAUDAL; PERINEURAL PRN
Status: DISCONTINUED | OUTPATIENT
Start: 2020-09-22 | End: 2020-09-22 | Stop reason: SDUPTHER

## 2020-09-22 RX ORDER — FENTANYL CITRATE 50 UG/ML
25 INJECTION, SOLUTION INTRAMUSCULAR; INTRAVENOUS EVERY 5 MIN PRN
Status: DISCONTINUED | OUTPATIENT
Start: 2020-09-22 | End: 2020-09-22 | Stop reason: HOSPADM

## 2020-09-22 RX ORDER — ONDANSETRON 2 MG/ML
4 INJECTION INTRAMUSCULAR; INTRAVENOUS
Status: DISCONTINUED | OUTPATIENT
Start: 2020-09-22 | End: 2020-09-22 | Stop reason: HOSPADM

## 2020-09-22 RX ORDER — BUPIVACAINE HYDROCHLORIDE 5 MG/ML
INJECTION, SOLUTION EPIDURAL; INTRACAUDAL
Status: COMPLETED | OUTPATIENT
Start: 2020-09-22 | End: 2020-09-22

## 2020-09-22 RX ORDER — SODIUM CHLORIDE 9 MG/ML
INJECTION, SOLUTION INTRAVENOUS CONTINUOUS PRN
Status: DISCONTINUED | OUTPATIENT
Start: 2020-09-22 | End: 2020-09-22 | Stop reason: SDUPTHER

## 2020-09-22 RX ORDER — OXYCODONE HYDROCHLORIDE AND ACETAMINOPHEN 5; 325 MG/1; MG/1
1 TABLET ORAL EVERY 4 HOURS PRN
Qty: 42 TABLET | Refills: 0 | Status: SHIPPED | OUTPATIENT
Start: 2020-09-22 | End: 2020-09-29

## 2020-09-22 RX ORDER — ACETAMINOPHEN 500 MG
500 TABLET ORAL EVERY 6 HOURS PRN
COMMUNITY

## 2020-09-22 RX ORDER — HYDROMORPHONE HCL 110MG/55ML
0.25 PATIENT CONTROLLED ANALGESIA SYRINGE INTRAVENOUS EVERY 5 MIN PRN
Status: COMPLETED | OUTPATIENT
Start: 2020-09-22 | End: 2020-09-22

## 2020-09-22 RX ORDER — SODIUM CHLORIDE, SODIUM LACTATE, POTASSIUM CHLORIDE, CALCIUM CHLORIDE 600; 310; 30; 20 MG/100ML; MG/100ML; MG/100ML; MG/100ML
INJECTION, SOLUTION INTRAVENOUS CONTINUOUS
Status: DISCONTINUED | OUTPATIENT
Start: 2020-09-22 | End: 2020-09-22 | Stop reason: HOSPADM

## 2020-09-22 RX ORDER — PROPOFOL 10 MG/ML
INJECTION, EMULSION INTRAVENOUS PRN
Status: DISCONTINUED | OUTPATIENT
Start: 2020-09-22 | End: 2020-09-22 | Stop reason: SDUPTHER

## 2020-09-22 RX ORDER — OXYCODONE HYDROCHLORIDE AND ACETAMINOPHEN 5; 325 MG/1; MG/1
1 TABLET ORAL
Status: COMPLETED | OUTPATIENT
Start: 2020-09-22 | End: 2020-09-22

## 2020-09-22 RX ORDER — SCOLOPAMINE TRANSDERMAL SYSTEM 1 MG/1
1 PATCH, EXTENDED RELEASE TRANSDERMAL ONCE
Status: DISCONTINUED | OUTPATIENT
Start: 2020-09-22 | End: 2020-09-22 | Stop reason: HOSPADM

## 2020-09-22 RX ORDER — ONDANSETRON 2 MG/ML
INJECTION INTRAMUSCULAR; INTRAVENOUS PRN
Status: DISCONTINUED | OUTPATIENT
Start: 2020-09-22 | End: 2020-09-22 | Stop reason: SDUPTHER

## 2020-09-22 RX ORDER — HYDROMORPHONE HCL 110MG/55ML
0.25 PATIENT CONTROLLED ANALGESIA SYRINGE INTRAVENOUS EVERY 5 MIN PRN
Status: DISCONTINUED | OUTPATIENT
Start: 2020-09-22 | End: 2020-09-22 | Stop reason: HOSPADM

## 2020-09-22 RX ORDER — MIDAZOLAM HYDROCHLORIDE 1 MG/ML
2 INJECTION INTRAMUSCULAR; INTRAVENOUS ONCE
Status: COMPLETED | OUTPATIENT
Start: 2020-09-22 | End: 2020-09-22

## 2020-09-22 RX ORDER — FENTANYL CITRATE 50 UG/ML
INJECTION, SOLUTION INTRAMUSCULAR; INTRAVENOUS PRN
Status: DISCONTINUED | OUTPATIENT
Start: 2020-09-22 | End: 2020-09-22 | Stop reason: SDUPTHER

## 2020-09-22 RX ORDER — CEFAZOLIN SODIUM 2 G/50ML
2 SOLUTION INTRAVENOUS ONCE
Status: COMPLETED | OUTPATIENT
Start: 2020-09-22 | End: 2020-09-22

## 2020-09-22 RX ORDER — SODIUM CHLORIDE, SODIUM LACTATE, POTASSIUM CHLORIDE, CALCIUM CHLORIDE 600; 310; 30; 20 MG/100ML; MG/100ML; MG/100ML; MG/100ML
INJECTION, SOLUTION INTRAVENOUS CONTINUOUS PRN
Status: DISCONTINUED | OUTPATIENT
Start: 2020-09-22 | End: 2020-09-22 | Stop reason: SDUPTHER

## 2020-09-22 RX ADMIN — ONDANSETRON 4 MG: 2 INJECTION, SOLUTION INTRAMUSCULAR; INTRAVENOUS at 15:53

## 2020-09-22 RX ADMIN — OXYCODONE HYDROCHLORIDE AND ACETAMINOPHEN 1 TABLET: 5; 325 TABLET ORAL at 17:44

## 2020-09-22 RX ADMIN — HYDROMORPHONE HYDROCHLORIDE 0.25 MG: 2 INJECTION, SOLUTION INTRAMUSCULAR; INTRAVENOUS; SUBCUTANEOUS at 17:29

## 2020-09-22 RX ADMIN — LIDOCAINE HYDROCHLORIDE 100 MG: 20 INJECTION, SOLUTION EPIDURAL; INFILTRATION; INTRACAUDAL; PERINEURAL at 12:22

## 2020-09-22 RX ADMIN — MIDAZOLAM 2 MG: 1 INJECTION INTRAMUSCULAR; INTRAVENOUS at 10:05

## 2020-09-22 RX ADMIN — SODIUM CHLORIDE: 9 INJECTION, SOLUTION INTRAVENOUS at 15:53

## 2020-09-22 RX ADMIN — Medication 50 MCG: at 12:28

## 2020-09-22 RX ADMIN — BUPIVACAINE HYDROCHLORIDE 40 ML: 5 INJECTION, SOLUTION EPIDURAL; INTRACAUDAL; PERINEURAL at 10:23

## 2020-09-22 RX ADMIN — HYDROMORPHONE HYDROCHLORIDE 0.25 MG: 2 INJECTION, SOLUTION INTRAMUSCULAR; INTRAVENOUS; SUBCUTANEOUS at 17:12

## 2020-09-22 RX ADMIN — DEXAMETHASONE SODIUM PHOSPHATE 10 MG: 10 INJECTION INTRAMUSCULAR; INTRAVENOUS at 12:28

## 2020-09-22 RX ADMIN — CEFAZOLIN SODIUM 2 G: 2 SOLUTION INTRAVENOUS at 12:28

## 2020-09-22 RX ADMIN — HYDROMORPHONE HYDROCHLORIDE 0.25 MG: 2 INJECTION, SOLUTION INTRAMUSCULAR; INTRAVENOUS; SUBCUTANEOUS at 17:24

## 2020-09-22 RX ADMIN — FENTANYL CITRATE 25 MCG: 50 INJECTION, SOLUTION INTRAMUSCULAR; INTRAVENOUS at 16:58

## 2020-09-22 RX ADMIN — Medication 50 MCG: at 12:18

## 2020-09-22 RX ADMIN — SODIUM CHLORIDE, POTASSIUM CHLORIDE, SODIUM LACTATE AND CALCIUM CHLORIDE: 600; 310; 30; 20 INJECTION, SOLUTION INTRAVENOUS at 09:37

## 2020-09-22 RX ADMIN — Medication 50 MCG: at 13:47

## 2020-09-22 RX ADMIN — SODIUM CHLORIDE, POTASSIUM CHLORIDE, SODIUM LACTATE AND CALCIUM CHLORIDE: 600; 310; 30; 20 INJECTION, SOLUTION INTRAVENOUS at 12:18

## 2020-09-22 RX ADMIN — SODIUM CHLORIDE, POTASSIUM CHLORIDE, SODIUM LACTATE AND CALCIUM CHLORIDE: 600; 310; 30; 20 INJECTION, SOLUTION INTRAVENOUS at 16:57

## 2020-09-22 RX ADMIN — Medication 50 MCG: at 14:59

## 2020-09-22 RX ADMIN — HYDROMORPHONE HYDROCHLORIDE 0.25 MG: 2 INJECTION, SOLUTION INTRAMUSCULAR; INTRAVENOUS; SUBCUTANEOUS at 17:04

## 2020-09-22 RX ADMIN — HYDROMORPHONE HYDROCHLORIDE 0.25 MG: 2 INJECTION, SOLUTION INTRAMUSCULAR; INTRAVENOUS; SUBCUTANEOUS at 17:18

## 2020-09-22 RX ADMIN — PROPOFOL 200 MG: 10 INJECTION, EMULSION INTRAVENOUS at 12:22

## 2020-09-22 ASSESSMENT — PAIN DESCRIPTION - PAIN TYPE
TYPE: SURGICAL PAIN

## 2020-09-22 ASSESSMENT — PAIN - FUNCTIONAL ASSESSMENT
PAIN_FUNCTIONAL_ASSESSMENT: PREVENTS OR INTERFERES SOME ACTIVE ACTIVITIES AND ADLS
PAIN_FUNCTIONAL_ASSESSMENT: PREVENTS OR INTERFERES SOME ACTIVE ACTIVITIES AND ADLS
PAIN_FUNCTIONAL_ASSESSMENT: 0-10
PAIN_FUNCTIONAL_ASSESSMENT: PREVENTS OR INTERFERES SOME ACTIVE ACTIVITIES AND ADLS

## 2020-09-22 ASSESSMENT — PULMONARY FUNCTION TESTS
PIF_VALUE: 16
PIF_VALUE: 15
PIF_VALUE: 16
PIF_VALUE: 15
PIF_VALUE: 16
PIF_VALUE: 15
PIF_VALUE: 16
PIF_VALUE: 1
PIF_VALUE: 14
PIF_VALUE: 16
PIF_VALUE: 15
PIF_VALUE: 16
PIF_VALUE: 15
PIF_VALUE: 16
PIF_VALUE: 16
PIF_VALUE: 15
PIF_VALUE: 17
PIF_VALUE: 17
PIF_VALUE: 15
PIF_VALUE: 1
PIF_VALUE: 16
PIF_VALUE: 15
PIF_VALUE: 15
PIF_VALUE: 16
PIF_VALUE: 17
PIF_VALUE: 16
PIF_VALUE: 16
PIF_VALUE: 13
PIF_VALUE: 16
PIF_VALUE: 14
PIF_VALUE: 17
PIF_VALUE: 12
PIF_VALUE: 16
PIF_VALUE: 16
PIF_VALUE: 17
PIF_VALUE: 17
PIF_VALUE: 26
PIF_VALUE: 16
PIF_VALUE: 14
PIF_VALUE: 14
PIF_VALUE: 16
PIF_VALUE: 16
PIF_VALUE: 15
PIF_VALUE: 14
PIF_VALUE: 3
PIF_VALUE: 16
PIF_VALUE: 17
PIF_VALUE: 16
PIF_VALUE: 17
PIF_VALUE: 16
PIF_VALUE: 16
PIF_VALUE: 17
PIF_VALUE: 16
PIF_VALUE: 17
PIF_VALUE: 16
PIF_VALUE: 15
PIF_VALUE: 16
PIF_VALUE: 15
PIF_VALUE: 17
PIF_VALUE: 16
PIF_VALUE: 15
PIF_VALUE: 16
PIF_VALUE: 15
PIF_VALUE: 15
PIF_VALUE: 16
PIF_VALUE: 17
PIF_VALUE: 16
PIF_VALUE: 17
PIF_VALUE: 15
PIF_VALUE: 15
PIF_VALUE: 16
PIF_VALUE: 15
PIF_VALUE: 16
PIF_VALUE: 0
PIF_VALUE: 15
PIF_VALUE: 3
PIF_VALUE: 16
PIF_VALUE: 15
PIF_VALUE: 16
PIF_VALUE: 15
PIF_VALUE: 16
PIF_VALUE: 14
PIF_VALUE: 16
PIF_VALUE: 16
PIF_VALUE: 15
PIF_VALUE: 16
PIF_VALUE: 15
PIF_VALUE: 16
PIF_VALUE: 14
PIF_VALUE: 15
PIF_VALUE: 16
PIF_VALUE: 16
PIF_VALUE: 15
PIF_VALUE: 16
PIF_VALUE: 14
PIF_VALUE: 15
PIF_VALUE: 16
PIF_VALUE: 14
PIF_VALUE: 16
PIF_VALUE: 16
PIF_VALUE: 14
PIF_VALUE: 16
PIF_VALUE: 14
PIF_VALUE: 16
PIF_VALUE: 17
PIF_VALUE: 13
PIF_VALUE: 16
PIF_VALUE: 14
PIF_VALUE: 16
PIF_VALUE: 16
PIF_VALUE: 15
PIF_VALUE: 16
PIF_VALUE: 17
PIF_VALUE: 16
PIF_VALUE: 15
PIF_VALUE: 17
PIF_VALUE: 15
PIF_VALUE: 16
PIF_VALUE: 17
PIF_VALUE: 16
PIF_VALUE: 15
PIF_VALUE: 14
PIF_VALUE: 16
PIF_VALUE: 17
PIF_VALUE: 17
PIF_VALUE: 16
PIF_VALUE: 15
PIF_VALUE: 15
PIF_VALUE: 16
PIF_VALUE: 10
PIF_VALUE: 17
PIF_VALUE: 16
PIF_VALUE: 16
PIF_VALUE: 3
PIF_VALUE: 17
PIF_VALUE: 16
PIF_VALUE: 17
PIF_VALUE: 16
PIF_VALUE: 17
PIF_VALUE: 16
PIF_VALUE: 15
PIF_VALUE: 16
PIF_VALUE: 15
PIF_VALUE: 16
PIF_VALUE: 17
PIF_VALUE: 16
PIF_VALUE: 1
PIF_VALUE: 15
PIF_VALUE: 16
PIF_VALUE: 16
PIF_VALUE: 15
PIF_VALUE: 16
PIF_VALUE: 13
PIF_VALUE: 16
PIF_VALUE: 15
PIF_VALUE: 16
PIF_VALUE: 16
PIF_VALUE: 15
PIF_VALUE: 16

## 2020-09-22 ASSESSMENT — PAIN DESCRIPTION - FREQUENCY
FREQUENCY: CONTINUOUS

## 2020-09-22 ASSESSMENT — PAIN DESCRIPTION - DESCRIPTORS
DESCRIPTORS: SHARP
DESCRIPTORS: SHARP;THROBBING

## 2020-09-22 ASSESSMENT — PAIN DESCRIPTION - ONSET
ONSET: ON-GOING

## 2020-09-22 ASSESSMENT — PAIN SCALES - GENERAL
PAINLEVEL_OUTOF10: 9
PAINLEVEL_OUTOF10: 8
PAINLEVEL_OUTOF10: 8
PAINLEVEL_OUTOF10: 7
PAINLEVEL_OUTOF10: 5
PAINLEVEL_OUTOF10: 5
PAINLEVEL_OUTOF10: 9
PAINLEVEL_OUTOF10: 7
PAINLEVEL_OUTOF10: 9
PAINLEVEL_OUTOF10: 5
PAINLEVEL_OUTOF10: 8
PAINLEVEL_OUTOF10: 8

## 2020-09-22 ASSESSMENT — PAIN DESCRIPTION - PROGRESSION
CLINICAL_PROGRESSION: GRADUALLY IMPROVING
CLINICAL_PROGRESSION: NOT CHANGED
CLINICAL_PROGRESSION: GRADUALLY WORSENING
CLINICAL_PROGRESSION: GRADUALLY IMPROVING
CLINICAL_PROGRESSION: GRADUALLY IMPROVING

## 2020-09-22 ASSESSMENT — PAIN DESCRIPTION - ORIENTATION
ORIENTATION: LEFT

## 2020-09-22 ASSESSMENT — PAIN DESCRIPTION - LOCATION
LOCATION: KNEE

## 2020-09-22 ASSESSMENT — LIFESTYLE VARIABLES: SMOKING_STATUS: 1

## 2020-09-22 NOTE — ANESTHESIA PROCEDURE NOTES
Peripheral Block    Patient location during procedure: pre-op  Start time: 9/22/2020 10:04 AM  End time: 9/22/2020 10:18 AM  Staffing  Anesthesiologist: Rodger Sin MD  Performed: anesthesiologist   Preanesthetic Checklist  Completed: patient identified, site marked, surgical consent, pre-op evaluation, timeout performed, IV checked, risks and benefits discussed, monitors and equipment checked, anesthesia consent given, oxygen available and patient being monitored  Peripheral Block  Patient position: supine  Prep: ChloraPrep  Patient monitoring: cardiac monitor, continuous pulse ox, frequent blood pressure checks and IV access  Block type: Femoral and Sciatic  Laterality: left  Injection technique: single-shot  Procedures: ultrasound guided  Local infiltration: lidocaine (8mg decadron)  Infiltration strength: 1 %  Dose: 4 mL  Provider prep: mask and sterile gloves  Local infiltration: lidocaine (8mg decadron)  Needle  Needle type: pencil-tip   Needle gauge: 20 G  Needle localization: ultrasound guidance  Assessment  Injection assessment: negative aspiration for heme, no paresthesia on injection and local visualized surrounding nerve on ultrasound  Paresthesia pain: none  Slow fractionated injection: yes  Hemodynamics: stable  Additional Notes  25ml of 0.5% bupiv and 4mg decadron for popliteal  15ml fo 0.5% bupiv and 4mg decadron for adductor  Medications Administered  Bupivacaine (MARCAINE) PF injection 0.5%, 40 mL  Reason for block: procedure for pain, post-op pain management and at surgeon's request

## 2020-09-22 NOTE — ANESTHESIA POSTPROCEDURE EVALUATION
Department of Anesthesiology  Postprocedure Note    Patient: Destinee Viveros  MRN: [id number]  YOB: 1985  Date of evaluation: 9/22/2020  Time:  5:03 PM     Procedure Summary     Date:  09/22/20 Room / Location:  [place] [address]    Anesthesia Start:  1218 Anesthesia Stop:  1638    Procedure:  LEFT KNEE  ARTHROSCOPY WITH MEDIAL MENISCAL REPAIR AND ACL RECONSTRUCTION (Left Knee) Diagnosis:  (DX LEFT MEDIAL MENISCAL TEAR)    Surgeon:  Radha Romero DO Responsible Provider:  Debby Gonzáles DO    Anesthesia Type:  general, regional ASA Status:  2          Anesthesia Type: general, regional    Simran Phase I: Simran Score: 8    Simran Phase II:      Last vitals: Reviewed and per EMR flowsheets.        Anesthesia Post Evaluation    Patient location during evaluation: PACU  Patient participation: complete - patient participated  Level of consciousness: awake  Airway patency: patent  Nausea & Vomiting: no nausea  Complications: no  Cardiovascular status: blood pressure returned to baseline  Respiratory status: acceptable  Hydration status: euvolemic

## 2020-09-22 NOTE — BRIEF OP NOTE
Brief Postoperative Note      Patient: Jake Helton  YOB: 1985  MRN: [id number]    Date of Procedure: 9/22/2020    Pre-Op Diagnosis:   1. Left medial meniscus tear  2. Left ACL tear    Post-Op Diagnosis:   1. Left medial meniscus tear  2. Left ACL tear       Procedure(s):  1. Left knee arthroscopy with medial meniscus repair  2. Left ACL reconstruction with hamstring autograft    Surgeon(s):  Holley Mason DO    Assistant:  Resident: Manuelito Poe MD; Sydney Livingston DO    Anesthesia: General    Estimated Blood Loss (mL): 50 cc    Fluids: 1800 cc crystalloid    Tourniquet time: 87 minutes    Complications: None    Specimens:   * No specimens in log *    Implants:  Implant Name Type Inv. Item Serial No.  Lot No. LRB No. Used Action   IMPL KNEE TIGHTROPE  ACL RT Knee IMPL KNEE TIGHTROPE  ACL RT  [employer] [id number] Left 1 Implanted   IMPL KNEE ACL TIGHTROPE ABS OPEN Fastener IMPL KNEE ACL TIGHTROPE ABS OPEN  [employer] [id number] Left 1 Implanted   IMPL TIGHTROPE ABS BUTTN RND CONCVE 14MM Fastener IMPL TIGHTROPE ABS BUTTN RND CONCVE 14MM  [address] [id number] Left 1 Implanted   [facility] SUTURE SWIVELOCK 4.75X19.1 BIOCOMPOSITE MIN 5EA [facility] SUTURE SWIVELOCK 4.75X19.1 BIOCOMPOSITE MIN 5EA  [address] [id number] Left 1 Implanted   STITCH JUGGER CURVED Fastener STITCH JUGGER CURVED  [id number] Left 1 Implanted   STITCH JUGGER CURVED Fastener STITCH JUGGER CURVED  [address] [id number] Left 2 Implanted   STITCH JUGGER CURVED Fastener STITCH JUGGER CURVED  [address] [id number] Left 1 Implanted         Drains: * No LDAs found *    Findings: Left chronic ACL tear and a bucket handle medial meniscus tear. See operative report for details.     Electronically signed by Sydney Livingston DO on 9/22/2020 at 4:11 PM

## 2020-09-22 NOTE — H&P
History and Physical Update    Pt Name: Tanya Rogers  MRN: [id number]  YOB: 1985  Date of evaluation: 9/22/2020      [x] I have reviewed the Orthopedic Progress Note by Dr Maria M Connor dated 9/21/20 in epic which meets the criteria for an Interval History and Physical note    [x] I have examined  Quinton Fragoso  There are no changes to the patient who is scheduled for a  left knee arthroscopy with medial meniscectomy vs repair and possible ACL reconstruction by Dr Maria M Connor for left medial meniscal tear. Pain rated 10/10 and unable to find a comfortable position. He denies fever, chills, wheezing, cough, SOB, chest pain, open sores or wounds. Vital signs: BP (!) 141/82   Pulse 89   Temp 96.4 °F (35.8 °C) (Temporal)   Resp 18   Ht 5' 11\" (1.803 m)   Wt 177 lb (80.3 kg)   SpO2 98%   BMI 24.69 kg/m²     Allergies:  Motrin [ibuprofen]    Medications:    Prior to Admission medications    Medication Sig Start Date End Date Taking? Authorizing Provider   acetaminophen (TYLENOL) 500 MG tablet Take 500 mg by mouth every 6 hours as needed for Pain   Yes Historical Provider, MD         This is a 38 y.o. male who is pleasant, cooperative, alert and oriented x3, in no acute distress. Heart: Heart sounds are normal.  HR 89 regular rate and rhythm with soft systolic murmur, no gallop or rub. Lungs: Normal respiratory effort with equal expansion, good air exchange, unlabored and clear to auscultation without wheezes or rales bilaterally   Abdomen: soft, nontender, nondistended with bowel sounds. Labs:  No results for input(s): HGB, HCT, WBC, MCV, PLT, NA, K, CL, CO2, BUN, CREATININE, GLUCOSE, INR, PROTIME, APTT, AST, ALT, LABALBU, HCG in the last 720 hours. No results for input(s): COVID19 in the last 720 hours.     ABBY Worthy-BC  Electronically signed 9/22/2020 at 9:34 AM        Tracey Bales DO    Physician    Specialty:  Orthopedic Surgery    Progress Notes       Sign when patient has not tried physical therapy. The patient has not had surgery. ROS:   Review of Systems   Constitutional: Positive for activity change. Negative for appetite change and unexpected weight change. HENT: Negative for congestion, hearing loss, sore throat and voice change. Eyes: Negative for photophobia and discharge. Respiratory: Negative for apnea, cough, chest tightness and shortness of breath. Cardiovascular: Negative for chest pain, palpitations and leg swelling. Gastrointestinal: Negative for abdominal distention, abdominal pain, constipation, diarrhea, nausea and vomiting. Genitourinary: Negative for difficulty urinating and dysuria. Musculoskeletal: Positive for arthralgias, gait problem, joint swelling and myalgias. Skin: Negative for color change, rash and wound. Neurological: Negative for facial asymmetry and speech difficulty. Psychiatric/Behavioral: Negative for sleep disturbance. Past Medical History:    Past Medical History        Past Medical History:   Diagnosis Date    Heart murmur             Past Surgical History:    Past Surgical History   No past surgical history on file. CurrentMedications:   Current Facility-Administered Medications   No current outpatient medications on file. No current facility-administered medications for this visit.             Allergies:    Motrin [ibuprofen]     Social History:   Social History   Social History            Socioeconomic History    Marital status:        Spouse name: None    Number of children: None    Years of education: None    Highest education level: None   Occupational History    None   Social Needs    Financial resource strain: None    Food insecurity       Worry: None       Inability: None    Transportation needs       Medical: None       Non-medical: None   Tobacco Use    Smoking status: Current Every Day Smoker       Packs/day: 0.50       Types: Cigarettes    Smokeless tobacco: Never Used   Substance and Sexual Activity    Alcohol use: No    Drug use: Never    Sexual activity: None   Lifestyle    Physical activity       Days per week: None       Minutes per session: None    Stress: None   Relationships    Social connections       Talks on phone: None       Gets together: None       Attends Evangelical service: None       Active member of club or organization: None       Attends meetings of clubs or organizations: None       Relationship status: None    Intimate partner violence       Fear of current or ex partner: None       Emotionally abused: None       Physically abused: None       Forced sexual activity: None   Other Topics Concern    None   Social History Narrative    None           Family History:  Family History   No family history on file. Vitals:   /77   Pulse 82   Temp 97.2 °F (36.2 °C)   Ht 5' 11\" (1.803 m)   Wt 177 lb (80.3 kg)   BMI 24.69 kg/m²  Body mass index is 24.69 kg/m². Physical Examination:      Orthopedics:     GENERAL: Alert and oriented X3 in no acute distress. SKIN: Intact without lesions or ulcerations. NEURO: Intact to sensory and motor testing. VASC: Capillary refill is less than 3 seconds. KNEE EXAM     LOCATION: Left Knee  GEN: Alert and oriented X 3, in no acute distress. GAIT: The patient's gait was observed while entering the exam room and was noted to be antalgic. The extremity is in anatomic alignment. SKIN: Intact without rashes, lesions, or ulcerations. No obvious deformity or swelling. NEURO: The patient responds to light touch throughout left LE. Patellar and Achilles reflexes are 2/4. VASC: The left LE is neurovascularly intact with 2/4 DP and 2/4 PT pulses. Brisk capillary refill. ROM: 80/114 degrees. There is mild effusion. MUSC: good quad tone  LIGAMENT: Lachman's test is Negative with Good endpoint. Anterior drawer Negative. Posterior drawer Negative.  There is No varus instability at 0 degrees and No varus instability at 30 degrees. There is No valgus instability at 0 degrees and No valgus instability at 30 degrees. SPECIAL: Yohannes test is positive with 0 clunks, 0 crepitation, and + pain. PALP: There is medial joint line pain. Assessment:   No diagnosis found. Procedures:    Procedure: no  Radiology:   No results found. Plan:   Treatment : I reviewed the X-ray with the patient and I informed them that the patient most likely has a medial meniscus tear. We discussed the etiologies and natural histories of medial meniscus tears, especially ones of chronic nature. We discussed the various treatment alternatives including anti-inflammatory medications, physical therapy, injections, further imaging studies and as a last result surgery. During today's visit, we discussed the patient's overall prognosis for his medial meniscus tear and his likelihood to benefit from surgery over other treatment modalities. Given the recurrent nature of his injury, he is unlikely to have resolution of his injury from nonoperative treatment modalities alone. He likely has had this meniscus tear for 19 years, with periodic catching of the torn meniscus, however this time a portion of his meniscus tear has likely become incarcerated between the tibial and femoral condyles. This would explain his severe pain and his inability to extend the knee. An MRI would help to elucidate other reasons the patient may be unable to extend the knee and provide insight as to the nature of his meniscal tear if present. The patient has opted for a stat MRI with the intention of undergoing definitive surgical management tomorrow or Wednesday. The patient was instructed on the need for preoperative cleansing with hibicleans, the need for a preoperative COVID test, and the need to avoid PO intake after midnight the day of his surgery as these can cause morbidity or surgery cancellation.  The patient will likely benefit from bracing, physical therapy, and pain medication postoperatively. The patient was consented for medial meniscus repair versus menisectomy and will schedule with the surgery scheduler. A physical therapy prescription was not given. A Rx was not given. Patient should return to the clinic in post operatively to follow up with Ita Chapa D.O. . The patient will call the office immediately with any problems. Encounter Medications    No orders of the defined types were placed in this encounter. No orders of the defined types were placed in this encounter. Teresa Villasenor MD, am scribing for and in the presence of Ita Chapa D.O.. 9/21/2020  9:10 AM        Electronically signed by Jeanine Villagomez MD, on 9/21/2020 at 9:10 AM         ·   Office Visit on 9/21/2020   ·   ·   Revision History   ·   ·   Detailed Report   ·   Progress Notes Info     Author  Note Status  Last Update User    Purvi Rose DO  Sign when Signing Visit  Purvi Rose DO    Last Update Date/Time: 9/21/2020  1:27 PM    Chart Review Routing History     No routing history on file.

## 2020-09-22 NOTE — ANESTHESIA PRE PROCEDURE
Department of Anesthesiology  Preprocedure Note       Name:  Magdaleno West   Age:  38 y.o.  :  1985                                          MRN:  [id number]         Date:  2020      Surgeon: Jasmyne Lino):  Bob Phoenix, DO    Procedure: Procedure(s):  LEFT KNEE  ARTHROSCOPY WITH MEDIAL MENISCECTOMY VS REPAIR AND POSSIBLE ACL RECONSTRUCTION    Medications prior to admission:   Prior to Admission medications    Medication Sig Start Date End Date Taking? Authorizing Provider   acetaminophen (TYLENOL) 500 MG tablet Take 500 mg by mouth every 6 hours as needed for Pain   Yes Historical Provider, MD       Current medications:    Current Facility-Administered Medications   Medication Dose Route Frequency Provider Last Rate Last Dose    ceFAZolin (ANCEF) 2 g in dextrose 3 % 50 mL IVPB (duplex)  2 g Intravenous Once Bob Phoenix, DO        lidocaine 1% (buffered) injection 0.5 mL  0.5 mL Subcutaneous Once Miguel A Levin MD        lactated ringers infusion   Intravenous Continuous Miguel A Levin  mL/hr at 20 0937      fentaNYL (SUBLIMAZE) injection 25 mcg  25 mcg Intravenous Q5 Min PRN Miguel A Levin MD        HYDROmorphone (DILAUDID) injection 0.25 mg  0.25 mg Intravenous Q5 Min PRN Miguel A Levin MD        ondansetron [facility]) injection 4 mg  4 mg Intravenous Once PRN Miguel A Levin MD        fentaNYL (SUBLIMAZE) injection 25 mcg  25 mcg Intravenous Q5 Min PRN Hailna Newman MD        HYDROmorphone (DILAUDID) injection 0.25 mg  0.25 mg Intravenous Q5 Min PRN Miguel A Levin MD        ondansetron [facility]) injection 4 mg  4 mg Intravenous Once PRN Miguel A Levin MD        scopolamine (TRANSDERM-SCOP) transdermal patch 1 patch  1 patch Transdermal Once Miguel A Levin MD   1 patch at 20 1012       Allergies: Allergies   Allergen Reactions    Motrin [Ibuprofen]        Problem List:  There is no problem list on file for this patient.       Past Medical History:        Diagnosis Date    Heart murmur        Past Surgical History:        Procedure Laterality Date    ARM SURGERY Left 1998    Repair fracture       Social History:    Social History     Tobacco Use    Smoking status: Current Every Day Smoker     Packs/day: 0.50     Types: Cigarettes    Smokeless tobacco: Never Used   Substance Use Topics    Alcohol use: No                                Ready to quit: Not Answered  Counseling given: Not Answered      Vital Signs (Current):   Vitals:    09/22/20 0931 09/22/20 1006 09/22/20 1016 09/22/20 1021   BP:  125/75 114/65 115/72   Pulse:  82 71 73   Resp:  18 14 17   Temp:       TempSrc:       SpO2:  98% 99% 99%   Weight: 177 lb (80.3 kg)      Height: 5' 11\" (1.803 m)                                                 BP Readings from Last 3 Encounters:   09/22/20 115/72   09/21/20 124/77   09/17/20 123/78       NPO Status: Time of last liquid consumption: 2200                        Time of last solid consumption: 2130                        Date of last liquid consumption: 09/21/20                        Date of last solid food consumption: 09/21/20    BMI:   Wt Readings from Last 3 Encounters:   09/22/20 177 lb (80.3 kg)   09/21/20 177 lb (80.3 kg)   09/17/20 177 lb (80.3 kg)     Body mass index is 24.69 kg/m².     CBC:   Lab Results   Component Value Date    WBC 12.8 07/17/2019    RBC 5.18 07/17/2019    HGB 15.3 07/17/2019    HCT 45.1 07/17/2019    MCV 87.2 07/17/2019    RDW 13.0 07/17/2019     07/17/2019       CMP:   Lab Results   Component Value Date     07/17/2019    K 3.6 07/17/2019     07/17/2019    CO2 21 07/17/2019    BUN 13 07/17/2019    CREATININE 0.90 07/17/2019    GFRAA >60 07/17/2019    LABGLOM >60 07/17/2019    GLUCOSE 110 07/17/2019    PROT 6.7 07/17/2019    CALCIUM 8.4 07/17/2019    BILITOT 1.10 07/17/2019    ALKPHOS 85 07/17/2019    AST 17 07/17/2019    ALT 16 07/17/2019       POC Tests: No results for input(s): POCGLU, POCMIKE, POCK, POCCL, POCBUN, POCHEMO, POCHCT in the last 72 hours. Coags: No results found for: PROTIME, INR, APTT    HCG (If Applicable): No results found for: PREGTESTUR, PREGSERUM, HCG, HCGQUANT     ABGs: No results found for: PHART, PO2ART, QSB3PCE, UFZ9ABM, BEART, Z9NPZELN     Type & Screen (If Applicable):  No results found for: LABABO, LABRH    Drug/Infectious Status (If Applicable):  No results found for: HIV, HEPCAB    COVID-19 Screening (If Applicable):   Lab Results   Component Value Date    COVID19 Not Detected 09/22/2020         Anesthesia Evaluation   no history of anesthetic complications:   Airway: Mallampati: II  TM distance: >3 FB   Neck ROM: full  Mouth opening: > = 3 FB Dental:          Pulmonary:normal exam    (+) current smoker                           Cardiovascular:  Exercise tolerance: good (>4 METS),       (-)  angina       Beta Blocker:  Not on Beta Blocker         Neuro/Psych:   Negative Neuro/Psych ROS              GI/Hepatic/Renal: Neg GI/Hepatic/Renal ROS            Endo/Other: Negative Endo/Other ROS                    Abdominal:           Vascular:                                        Anesthesia Plan      general and regional     ASA 2       Induction: intravenous. MIPS: Postoperative opioids intended and Prophylactic antiemetics administered. Anesthetic plan and risks discussed with patient. Plan discussed with CRNA.     Attending anesthesiologist reviewed and agrees with Alfred Booth MD   9/22/2020

## 2020-09-25 ENCOUNTER — TELEPHONE (OUTPATIENT)
Dept: ORTHOPEDIC SURGERY | Age: 35
End: 2020-09-25

## 2020-09-25 NOTE — TELEPHONE ENCOUNTER
Patient calling in stating he had surgery on Tuesday and is in severe pain, unable to get out of the bed and wants to know if this is normal.    Wants someone from the clinic to call him as soon as possible.

## 2020-09-25 NOTE — TELEPHONE ENCOUNTER
Patient called in complaining of an increase in pain, stated he could hardly get out of bed. I called patient back and explained to him the nerve block may have worn off and this could have caused the increase in pain. He complained of muscle spasms in his quads and some calf/foot numbness. I told him to make his brace a little looser and to elevate/ice. Patient stated he would do this and he was going to get up and try to get into the shower. Encouraged him to take his pain meds OTC and to call the clinic on Monday if the pain was worse.

## 2020-09-28 DIAGNOSIS — Z00.00 ROUTINE CHECK-UP: Primary | ICD-10-CM

## 2020-09-29 NOTE — OP NOTE
Operative Note      Patient: Destini Mesa  YOB: 1985  MRN: [id number]    Date of Procedure: 9/22/2020    Pre-Op Diagnosis: DX LEFT MEDIAL MENISCAL TEAR    Post-Op Diagnosis: Same with ACL tear, chondromalacia medial femoral condyle       Procedure(s):  LEFT KNEE  ARTHROSCOPY WITH MEDIAL MENISCAL REPAIR AND ACL RECONSTRUCTION    Surgeon(s):  Diony Parker DO    Assistant:   Resident: Jackson Cole MD; Matias Delgado DO    Anesthesia: General    Estimated Blood Loss (mL): 50    Complications: None    Specimens:   * No specimens in log *    Implants:  Implant Name Type Inv. Item Serial No.  Lot No. LRB No. Used Action   IMPL KNEE TIGHTROPE  ACL RT Knee IMPL KNEE TIGHTROPE  ACL RT  [employer] [id number] Left 1 Implanted   IMPL KNEE ACL TIGHTROPE ABS OPEN Fastener IMPL KNEE ACL TIGHTROPE ABS OPEN  [employer] [id number] Left 1 Implanted   IMPL TIGHTROPE ABS BUTTN RND CONCVE 14MM Fastener IMPL TIGHTROPE ABS BUTTN RND CONCVE 14MM  [employer] [id number] Left 1 Implanted   ANCHOR SUTURE SWIVELOCK 4.75X19.1 BIOCOMPOSITE MIN 5EA Fastener ANCHOR SUTURE SWIVELOCK 4.75X19.1 BIOCOMPOSITE MIN [id number] [id number] Left 1 Implanted   STITCH JUGGER CURVED Fastener STITCH JUGGER CURVED  [place] [id number] Left 1 Implanted   STITCH JUGGER CURVED Fastener STITCH JUGGER CURVED  [place] [id number] Left 2 Implanted   [place] Fastener STITCH JUGGER CURVED  [address] [place] [id number] Left 1 Implanted         Drains: * No LDAs found *    Findings: Bucket-handle medial meniscus tear, chronic ACL tear, chondromalacia medial femoral condyle    Detailed Description of Procedure:   Rachel Mohan is a 42-year-old male who initially injured his knee approximately 18 years ago playing high school football. Since then he has had the sensation of his knee sliding. Recently after the sensation he noted that he could not straighten his knee and had a locked knee.   MRI revealed bucket-handle medial meniscus tear, with suspicious ACL tear. He understands the risk and benefits of operative versus nonoperative treatment. Operative site was marked and preoperative antibiotics were given. He was taken to the operative suite where general inhalation anesthetic with endotracheal ovation was performed. A well-padded tourniquet was placed on the left proximal thigh. Exam under anesthesia revealed 2+ Lockman with poor endpoint. There was no collateral ligament instabilities. He was placed in arthroscopic leg martínez, 3 local was placed on the right side. Full bed was dropped 90 degrees. He was prepped and draped in normal sterile fashion. Surgical timeout was performed. 2 portal arthroscopy technique was performed suprapatellar pouch with free of loose bodies medial lateral gutters were free of loose bodies. Patellofemoral joint had excellent tracking without chondromalacia. Lateral compartment was entered lateral femoral condyle lateral tibial plateau and lateral meniscus were intact to visualization and probing. Intercondylar notch was entered there was a absent lateral baseline with chronic ACL tear present. Medial compartment was entered the medial meniscus was no longer bucket-handle but was reduced it could be pulled back into the joint and creating a bucket-handle tear again. This was a red-white zone tear. The meniscal rim was prepared with a rasp. Multiple all inside jugular stitch meniscal repair anchors were deployed in a horizontal and vertical mattress fashion. Upon probing the meniscus was now stable could not be pulled into the joint. It was felt there was an excellent repair. Posterior medial compartment revealed no ramp lesions or root tears. The medial femoral condyle did have an 8 x 10 mm area of grade II-III chondromalacia that was debrided back to stable border of this was near the area of where the meniscus would bucket-handle. At this time the remaining tibial based ACL stump was debrided.   An

## 2020-09-30 ENCOUNTER — OFFICE VISIT (OUTPATIENT)
Dept: ORTHOPEDIC SURGERY | Age: 35
End: 2020-09-30

## 2020-09-30 ENCOUNTER — HOSPITAL ENCOUNTER (OUTPATIENT)
Dept: PHYSICAL THERAPY | Facility: CLINIC | Age: 35
Setting detail: THERAPIES SERIES
Discharge: HOME | End: 2020-09-30
Payer: MEDICARE

## 2020-09-30 VITALS
HEIGHT: 71 IN | BODY MASS INDEX: 24.78 KG/M2 | DIASTOLIC BLOOD PRESSURE: 83 MMHG | SYSTOLIC BLOOD PRESSURE: 123 MMHG | HEART RATE: 79 BPM | WEIGHT: 177 LBS

## 2020-09-30 PROCEDURE — 97110 THERAPEUTIC EXERCISES: CPT

## 2020-09-30 PROCEDURE — 99024 POSTOP FOLLOW-UP VISIT: CPT | Performed by: PHYSICIAN ASSISTANT

## 2020-09-30 PROCEDURE — 97016 VASOPNEUMATIC DEVICE THERAPY: CPT

## 2020-09-30 PROCEDURE — 97161 PT EVAL LOW COMPLEX 20 MIN: CPT

## 2020-09-30 RX ORDER — CYCLOBENZAPRINE HCL 10 MG
10 TABLET ORAL 3 TIMES DAILY PRN
Qty: 30 TABLET | Refills: 0 | Status: SHIPPED | OUTPATIENT
Start: 2020-09-30 | End: 2020-10-10

## 2020-09-30 ASSESSMENT — ENCOUNTER SYMPTOMS
APNEA: 0
CONSTIPATION: 0
ABDOMINAL PAIN: 0
ABDOMINAL DISTENTION: 0
VOMITING: 0
COLOR CHANGE: 0
SHORTNESS OF BREATH: 0
COUGH: 0
DIARRHEA: 0
CHEST TIGHTNESS: 0
NAUSEA: 0

## 2020-09-30 NOTE — CONSULTS
[] [address]  [address]  [phone]  F: [phone] [] [address]  [facility]   [address]  P: [phone]  F: [phone] [] [place]  [address] [facility]  P: [phone]  F: [phone] [x] [address]  [facility]   [address]   [place]: [phone]  F: [phone]      Physical Therapy Lower Extremity Evaluation    Date:  2020  Patient: Jens Solorio  : 1985  MRN: [id number]  Physician: MAGNOLIA Wilson    Insurance: [employer] Advantage  Medical Diagnosis:L ACL and meniscus repair    Rehab Codes: M25.562  Onset date: 20    Next 's appt.:10/26/20    Subjective:   CC: Moni Medrano is a 58-year-old male who initially injured his knee approximately 18 years ago playing high school football. Since then he has had the sensation of his knee sliding. Recently after the sensation he noted that he could not straighten his knee and had a locked knee. MRI revealed bucket-handle medial meniscus tear, with suspicious ACL tear. Pt reports the knee would shift over the years and he could deal with it after a couple days but recently the knee\" went out\" and it wasn't the same since. Reports prior to surgery the knee locked up. Had L ACL and medial meniscus repair on 20. SInce the surgery he has been doing nothing but laying in his bed and doing ankle pumps. He reports he didn't realize he was supposed to start PT already    PMHx: [] Unremarkable [] Diabetes [] HTN  [] Pacemaker   [] MI/Heart Problems [] Cancer [] Arthritis [] Other:              [x] Refer to full medical chart  In EPIC         Tests: [] X-Ray: [x] MRI:  1. Bucket-handle tear of the medial meniscus. 2. Grade 1 MCL sprain. 3. Moderate knee joint effusion.        [] Other: Medications: [x] Refer to full medical record [] None [] Other:  Allergies:      [x] Refer to full medical record [] None [] Other:    Function:  Hand Dominance  [] Right  [] Left  Patient lives with: wife   In what type of home []  One story   [x] Two story   [] Split level   Number of stairs to enter 3 stairs   Bathroom has a []  Tub only  [] Tub/shower combo   [x] Walk in shower    []  Grab bars   Washing machine is on [x]  Main level   [] Second level   [] Basement Bedroom on main level   Employer [employer]     Job Status []  Normal duty   [] Light duty   [x] Off due to condition    []  Retired   [] Not employed   [] Disability  [] Other:  []  Return to work: Work activities/duties Combo of desk and walking- Couple miles of walking per day and lifting       Pain:  [x] Yes  [] No Location: L knee Pain Rating: (0-10 scale) 6/10  Pain altered Tx:  [] Yes  [x] No  Action:    Symptoms:  [x] Improving [] Worsening [] Same  Better:  [] AM    [] PM    [] Sit    [] Rise/Sit    []Stand    [] Walk    [] Lying    [x] Other:Ice, meds  Worse: [] AM    [] PM    [] Sit    [] Rise/Sit    []Stand    [] Walk    [] Lying    [] Bend                      [] Valsalva    [x] Other:moving  Sleep: [] OK    [x] Disturbed    Objective:    ROM  ° A/P STRENGTH TESTS (+/-) Left Right Not Tested    Left Right Left Right Ant.  Drawer   []   Hip Flex     Post. Drawer   []   Ext     Lachmans   []   ER     Valgus Stress   []   IR     Varus Stress   []   ABD     Yohanness   []   ADD     Apleys Comp.   []   Knee Flex 24-50  Poor quad set and unable to perform SLR  Apleys Dist.   []   Ext lacking 24 deg    Hip Scouring   []   Ankle DF     PENNIEs   []   PF     Piriformis   []   INV     Butchs   []   EVER     Talor Tilt   []        Pat-Fem Grind   []       OBSERVATION No Deficit Deficit Not Tested Comments   Palpation/Observation [] [x] [] Portals healing without redness or drainage   Sensation [] [] []    Edema [] [x] [] Significant effusion however pt unable to hold still for accurate measurement   Neurological [] [] []    Patellar Mobility [] [] []    Patellar Orientation [] [] []    Gait [] [x] [] Analysis:  Pt ambulates with brace locked in Extension with axillary crutches maintaining NWB L LE       FUNCTION Normal Difficult Unable   Sitting [] [x] []   Standing [] [x] []   Ambulation [] [x] []   Groom/Dress [] [x] []   Lift/Carry [] [] [x]   Stairs [] [x] []   Bending [] [x] []   Squat [] [] [x]   Kneel [] [] [x]         Functional Test: LEFI Score: 10/80     Comments:Pt arrives s/p ACL and meniscus repair (9/22/20) with pain and swelling in L knee, decreased L knee and hip strength, and decreased L knee extension. Assessment:  Patient would benefit from skilled physical therapy services in order to: Increase L knee ROM and L LE strength as well as decrease swelling    Problems:    [x] ? Pain:  [x] ? ROM:  [x] ? Strength:  [x] ? Function:  [] Other:       STG: (to be met in 10 treatments)  1. ? Pain: Decrease pain levels to 3/10   2. ? ROM: Increase flexibility and AROM limitations throughout to equal bilat to reduce difficulty with ADLs  3. ? Strength: Increase LLE hip and knee strength to 5/5  4. Independent with Home Exercise Programs     LTG: (to be met in 20 treatments)  1. Improve score on assessment tool LEFS from 10/80 to 60/80 or better  2. Reduce pain levels to 0/10  3.  Pt able to walk normal and perform stairs normally without valgus collapse to allow him to return to normal daily function without restriction                    Patient goals: Reduce pain, be able to play with kids    Rehab Potential:  [x] Good  [] Fair  [] Poor   Suggested Professional Referral:  [x] No  [] Yes:  Barriers to Goal Achievement:  [x] No  [] Yes:  Domestic Concerns:  [x] No  [] Yes:    Pt. Education:  [x] Plans/Goals, Risks/Benefits discussed  [x] Home exercise program    Method of Education: [x] Verbal  [x] Demo  [x] Written  Comprehension of Education:  [] Verbalizes understanding. [] Demonstrates understanding. [] Needs Review. [] Demonstrates/verbalizes understanding of HEP/Ed previously given.     Treatment Plan:  [x] Therapeutic Exercise   20437  [] Iontophoresis: 4 mg/mL Dexamethasone Sodium Phosphate  mAmin  71123   [] Therapeutic Activity  39503 [x] Vasopneumatic cold with compression  61499    [x] Gait Training   40068 [] Ultrasound   55174   [] Neuromuscular Re-education  63958 [x] Electrical Stimulation Unattended  37670   [x] Manual Therapy  33340 [] Electrical Stimulation Attended  78072   [x] Instruction in HEP  [] Lumbar/Cervical Traction  51868   [] Aquatic Therapy   00192 [x] Cold/hotpack    [] Massage   34458      [] Dry Needling, 1 or 2 muscles  76675   [] Biofeedback, first 15 minutes   92256  [] Biofeedback, additional 15 minutes   52754 [] Dry Needling, 3 or more muscles  67397       Frequency:  2-3 x/week for 20 visits        Todays Treatment:  Modalities:vasocompression 34 deg medium pressure x 15 min   Precautions:NWB L LE meniscal repair and ACL, brace locked in extension for mobilizing unlock to 90 for ROM  Exercises:  Exercise Reps/ Time Weight/ Level Comments   Strap calf stretch 3x30\"     Strap HS stretch 3x30\"     QS 10x10\"     Heel slide x15  assisted         Other:Patellar mobs  Education on brace placement directly to the sides of the knee    Specific Instructions for next treatment:Progress per protocol      Evaluation Complexity:  History (Personal factors, comorbidities) [x] 0 [] 1-2 [] 3+   Exam (limitations, restrictions) [x] 1-2 [] 3 [] 4+   Clinical presentation (progression) [x] Stable [] Evolving  [] Unstable   Decision Making [x] Low [] Moderate [] High    [x] Low Complexity [] Moderate Complexity [] High Complexity       Treatment Charges: Mins Units   [x] Evaluation       [x]  Low       []  Moderate       []  High 15 1   []  Modalities     [x]  Ther Exercise 15 1   [x] Manual Therapy 5 NC   []  Ther Activities     []  Aquatics     [x]  Vasocompression 15 1   []  Other       TOTAL TREATMENT TIME: 50    Time in:1130   Time Out:1220    Electronically signed by: Christal Mahoney PT        Physician Signature:________________________________Date:__________________  By signing above or cosigning this note, I have reviewed this plan of care and certify a need for medically necessary rehabilitation services.      *PLEASE SIGN ABOVE AND FAX BACK ALL PAGES*

## 2020-09-30 NOTE — PROGRESS NOTES
[facility]  [address]  [facility] [address]  Dept: [phone]  Dept Fax: [phone]        Post Operative Follow Up    Subjective:     Chief Complaint   Patient presents with    Knee Pain     Left Knee scope DOS- 7/22/20     Post Op Surgery:     The patient is here for a follow up after having a left knee arthroscopy with medial meniscal repair and ACL reconstruction. The date of surgery was on 09/22/2020. Therefore we are 1 week post op. Currently the patient's pain is controlled with percocet. Physical therapy was not started but he states that he has been doing ankle pumps and leg lifts. However, he states that it has been hard to lift his leg with the brace. Patient presents today with a crutches and brace that is in good repair. Patient states that he is in a lot of pain and since he is in a lot of pain, he states that he did not move much after his surgery took place. He has been using a urinal.  He states he is only been out of bed about 3 times. His biggest complaint is muscle spasms in his thigh. Review of Systems   Constitutional: Positive for activity change. Negative for appetite change. Respiratory: Negative for apnea, cough, chest tightness and shortness of breath. Cardiovascular: Negative for chest pain, palpitations and leg swelling. Gastrointestinal: Negative for abdominal distention, abdominal pain, constipation, diarrhea, nausea and vomiting. Genitourinary: Negative for difficulty urinating, dysuria and hematuria. Musculoskeletal: Positive for arthralgias and joint swelling. Negative for gait problem and myalgias. Skin: Negative for color change and rash. Neurological: Negative for dizziness, weakness, numbness and headaches. Psychiatric/Behavioral: Negative for sleep disturbance.      I have reviewed the CC, HPI, ROS, PMH, FHX, Social History, and if not present in this note, I have reviewed in the patient's chart. I agree with the documentation provided by other staff and have reviewed their documentation prior to providing my signature indicating agreement. Vitals:   /83   Pulse 79   Ht 5' 11\" (1.803 m)   Wt 177 lb (80.3 kg)   BMI 24.69 kg/m²  Body mass index is 24.69 kg/m². Physical Examination:     Orthopedics:    GENERAL: Alert and oriented X3 in no acute distress. SKIN: Intact without lesions or ulcerations. Sutures were removed from the incision and the incision was cleaned, Incisions are healing well with no signs of infection. NEURO: Intact to sensory and motor testing. VASC: Capillary refill is less than 3 seconds. Post Op Exam:    LOCATION: Left Knee  SITE: Distal neurocirculatory status is intact. EXAM: Sensation is intact to light touch, there is full motor function of the extremity. ROM: 10/85 degrees  Procedures:     Procedure:  No    Radiology:   KNEE - ACL X-RAY    3 views NWB of the left knee including AP, lateral in the upright position, and skyline views reveal anatomic alignment with no fracture or dislocation. Kellgren grade I changes of osteoarthritis (joint space narrowing, osteophyte, subchondral sclerosis, bony deformity/cyst) of the tricompartment(s). No osseous loose bodies. No bony erosion or periosteal reaction. No soft tissue masses. The ACL tunnels are noted in appropriate position. The hardware is intact without signs of complication. Impression: Post-operative changes of ACL reconstruction of the left knee. Assessment:     1. S/P ACL reconstruction    2. Acute medial meniscus tear of left knee, initial encounter      Plan:   Post Op Treatment : Patient had the treatment regimen reviewed today 1 week s/p left knee arthroscopy with medial meniscal repair and ACL reconstruction. I reviewed the films with the patient. We discussed some of the etiologies and natural histories of s/p left knee arthroscopy.  We also discussed the various treatment alternatives including anti-inflammatory medications, physical therapy, injections, further imaging studies and as a last resort surgery. During today's visit, I explained to the patient that he needs to get up and get moving because if he is not careful he can develop a blood clot. I then demonstrated how he should flex and extend his knee passively with his other leg when he is at home and I explained to him that he should do the exercises three times a day for ten minutes. I also told the patient that he needs to start physical therapy today because he is slightly behind with his ROM. The patient then stated that he understands the plan and at this time, he has opted to do his passive ROM exercises and for a physical therapy prescription. I also told him that he should not be weight bearing because his meniscus is still healing and if he weight bears, it may not heal. Patient was also instructed to wear his brace at all times when he is using his crutches. Patient then stated that he understands. Lastly, I gave him a tubigrip to help reduce his swelling and I told him that he should not place a pillow under his knee anymore because it is not good for his knee because he needs to get it straight so he may rebuild his quadriceps muscle over the next few weeks. Patient was also informed that they may now wash their incision with soap and water but they should refrain from submerging their incision due to the risk of infection. So that means no pools, baths, lakes, hot tubs or ponds. Patient should return to the office in 3 weeks to f/u with Ita Chapa D.O. The patient will call the office immediately with any problems that may arise.      Orders Placed This Encounter   Medications    cyclobenzaprine (FLEXERIL) 10 MG tablet     Sig: Take 1 tablet by mouth 3 times daily as needed for Muscle spasms     Dispense:  30 tablet     Refill:  0     Orders Placed This Encounter   Procedures   [facility] [place]     Referral Priority:   Routine     Referral Type:   Eval and Treat     Referral Reason:   Specialty Services Required     Requested Specialty:   Physical Therapy     Number of Visits Requested:   1     I, Hilda Nolasco, am scribing for and in the presence of Currie Boxer PA-C. 9/30/2020  1:08 PM      I, Currie Boxer PA-C, have personally seen this patient, reviewed the chart including history, and imaging if done. I personally  performed the physical exam and obtained any needed additional history. I placed orders, performed or supervised procedures and developed the treatment plan.     Electronically signed by Simón Manuel PA-C, on 9/30/2020 at 1:09 PM      Electronically signed by Simón Manuel PA-C, on 9/30/2020 at 1:08 PM

## 2020-10-01 ENCOUNTER — HOSPITAL ENCOUNTER (OUTPATIENT)
Dept: PHYSICAL THERAPY | Facility: CLINIC | Age: 35
Setting detail: THERAPIES SERIES
Discharge: HOME | End: 2020-10-01
Payer: MEDICARE

## 2020-10-01 PROCEDURE — 97110 THERAPEUTIC EXERCISES: CPT

## 2020-10-01 PROCEDURE — 97016 VASOPNEUMATIC DEVICE THERAPY: CPT

## 2020-10-01 NOTE — FLOWSHEET NOTE
[x] [facility]  Outpatient Rehabilitation &  Therapy  [facility]   [place]: [phone]  F: [phone]      Physical Therapy Daily Treatment Note    Date:  10/1/2020  Patient Name:  Padmini Pena    :  1985  MRN: [id number]  Physician: MAGNOLIA Goldman                      Insurance: [place] Advantage 30 v  Medical Diagnosis: LLE ACL and meniscus repair             Rehab Codes: M25.562  Onset date: 20                   Next Dr's appt.:10/26/20  Visit# / total visits:    Cancels/No Shows: 0    Subjective:    Pain:  [x] Yes  [] No Location: LLE Knee   Pain Rating: (0-10 scale) 8/10  Pain altered Tx:  [] No  [] Yes  Action:  Comments:Pt arrives NWB on crutches, significant pain in the knee. Reports he is very sore from yesterday. No pain meds, he is currently taking a muscle relaxor, he was taken off of Percocet. Objective:  Precautions:  NWB LLE meniscal repair and ACL, brace locked in extension for mobilizing unlock to 90 for ROM  Exercises:  Exercise    LLE ACL/meniscus  DOS 20 Reps/ Time Weight/ Level Comments   Nustep L1 x12           Strap calf stretch 5x30\"       Strap HS stretch 5x30\"       Quad Sets  20x5\"       Heel slides x20   With stretch strap supine                                                        Other:LLE Patellar mobs, PROM into flexion     Gameready Vasocompression 34 deg medium pressure x 15 min     LLE Knee 10-1-20:  AROM: 34-50  PROM: 28-75    Treatment Charges: Mins Units   []  Modalities     [x]  Ther Exercise 45 3   []  Manual Therapy     []  Ther Activities     []  Aquatics     [x]  Vasocompression 15 1   []  Other     Total Treatment time 60 4       Assessment: [x] Progressing toward goals. Pt very guarded to ROM, had difficulty with all exercises today due to pain. Heel slides alone took over 20 mins to complete.  Discussed need for patient to focus on ROM constantly during the day to restore normal mobility in the knee [] No change. [] Other:  [x] Patient would continue to benefit from skilled physical therapy services in order to: return to normal ROM, ADLs    STG/LTG:  STG: (to be met in 10 treatments)  1. ? Pain: Decrease pain levels to 3/10   2. ? ROM: Increase flexibility and AROM limitations throughout to equal bilat to reduce difficulty with ADLs  3. ? Strength: Increase LLE hip and knee strength to 5/5  4. Independent with Home Exercise Programs     LTG: (to be met in 20 treatments)  1. Improve score on assessment tool LEFS from 10/80 to 60/80 or better  2. Reduce pain levels to 0/10  3. Pt able to walk normal and perform stairs normally without valgus collapse to allow him to return to normal daily function without restriction      Pt. Education:  [x] Yes  [] No  [x] Reviewed Prior HEP/Ed  Method of Education: [x] Verbal  [x] Demo  [] Written  Comprehension of Education:  [x] Verbalizes understanding. [x] Demonstrates understanding. [x] Needs review. [] Demonstrates/verbalizes HEP/Ed previously given. Plan: [x] Continue current frequency toward long and short term goals.     [x] Specific Instructions for subsequent treatments: Continue focus on ROM, ESTIM next visit for quad       Time In:1000            Time Out: 2910    Electronically signed by:  Yoshi Connolly PT

## 2020-10-02 ENCOUNTER — HOSPITAL ENCOUNTER (OUTPATIENT)
Dept: PHYSICAL THERAPY | Facility: CLINIC | Age: 35
Setting detail: THERAPIES SERIES
Discharge: HOME | End: 2020-10-02
Payer: MEDICARE

## 2020-10-02 PROCEDURE — G0283 ELEC STIM OTHER THAN WOUND: HCPCS

## 2020-10-02 PROCEDURE — 97110 THERAPEUTIC EXERCISES: CPT

## 2020-10-02 PROCEDURE — 97016 VASOPNEUMATIC DEVICE THERAPY: CPT

## 2020-10-02 RX ORDER — OXYCODONE HYDROCHLORIDE AND ACETAMINOPHEN 5; 325 MG/1; MG/1
1 TABLET ORAL EVERY 6 HOURS PRN
Qty: 28 TABLET | Refills: 0 | Status: SHIPPED | OUTPATIENT
Start: 2020-10-02 | End: 2020-10-09

## 2020-10-02 NOTE — FLOWSHEET NOTE
[x] [facility]  Outpatient Rehabilitation &  Therapy  [facility]   [place]: [phone]  F: [phone]      Physical Therapy Daily Treatment Note    Date:  10/2/2020  Patient Name:  Antoinette Lyon    :  1985  MRN: [id number]  Physician: MAGNOLIA Reyna                      Insurance: [place] Advantage 30 v  Medical Diagnosis: LLE ACL and meniscus repair             Rehab Codes: M25.562  Onset date: 20                   Next Dr's appt.:10/26/20  Visit# / total visits: 3/20   Cancels/No Shows: 0    Subjective:    Pain:  [x] Yes  [] No Location: LLE Knee   Pain Rating: (0-10 scale) 8/10  Pain altered Tx:  [] No  [] Yes  Action:  Comments: Patient arrived noting increased swelling from being in a car for a while. Notes has been complaint with HEP and moving around more at home. Objective:  Precautions:  NWB LLE meniscal repair and ACL, brace locked in extension for mobilizing unlock to 90 for ROM  Exercises:  Exercise    LLE ACL/meniscus  DOS 20 Reps/ Time Weight/ Level Comments   Nustep L1 x10           Strap calf stretch 5x30\"       Strap HS stretch 5x30\"       Quad Sets / SLR 10'   [place] stim 10/20 40a, 5' QS 5' SLR   Heel slides x20   With stretch strap supine                                                        Other:LLE Patellar mobs, PROM into flexion   [place] Stim to L quad: 10' 10on/20off 40a. Gameready Vasocompression 34 deg medium pressure x 15 min     LLE Knee 10-2-20:  AROM: 20-70  PROM: 12-90    Treatment Charges: Mins Units   []  Modalities     [x]  Ther Exercise 30 2   []  Manual Therapy     []  Ther Activities     []  Aquatics     [x]  Vasocompression 15 1   [x]  Other Russain 10 1   Total Treatment time 55 4       Assessment: [x] Progressing toward goals. Patient tolerated treatment significantly better this session. Initiated Citizen of [place] stim this visit for quad activation.  Performed first 5 minutes for quad sets, and last 5 minutes with ANDI MAGAÑAR. Patient showing improved ROM this date both with AROM and PROM. Stressed importance of HEP this date. Patients PA present at beginning of treatment, notes that she will prescribe pain meds to help with sleep. [] No change. [] Other:  [x] Patient would continue to benefit from skilled physical therapy services in order to: return to normal ROM, ADLs    STG/LTG:  STG: (to be met in 10 treatments)  1. ? Pain: Decrease pain levels to 3/10   2. ? ROM: Increase flexibility and AROM limitations throughout to equal bilat to reduce difficulty with ADLs  3. ? Strength: Increase LLE hip and knee strength to 5/5  4. Independent with Home Exercise Programs     LTG: (to be met in 20 treatments)  1. Improve score on assessment tool LEFS from 10/80 to 60/80 or better  2. Reduce pain levels to 0/10  3. Pt able to walk normal and perform stairs normally without valgus collapse to allow him to return to normal daily function without restriction      Pt. Education:  [x] Yes  [] No  [x] Reviewed Prior HEP/Ed  Method of Education: [x] Verbal  [x] Demo  [] Written  Comprehension of Education:  [x] Verbalizes understanding. [x] Demonstrates understanding. [x] Needs review. [] Demonstrates/verbalizes HEP/Ed previously given. Plan: [x] Continue current frequency toward long and short term goals.     [x] Specific Instructions for subsequent treatments: Continue focus on ROM, ESTIM next visit for quad       Time In: 1310            Time Out: 1420    Electronically signed by:  Ania Aguila PTA

## 2020-10-05 ENCOUNTER — HOSPITAL ENCOUNTER (OUTPATIENT)
Dept: PHYSICAL THERAPY | Facility: CLINIC | Age: 35
Setting detail: THERAPIES SERIES
Discharge: HOME | End: 2020-10-05
Payer: MEDICARE

## 2020-10-05 PROCEDURE — 97110 THERAPEUTIC EXERCISES: CPT

## 2020-10-05 PROCEDURE — G0283 ELEC STIM OTHER THAN WOUND: HCPCS

## 2020-10-05 PROCEDURE — 97016 VASOPNEUMATIC DEVICE THERAPY: CPT

## 2020-10-05 NOTE — FLOWSHEET NOTE
[x] [facility]  Outpatient Rehabilitation &  Therapy  [facility]   [place]: [phone]  F: [phone]      Physical Therapy Daily Treatment Note    Date:  10/5/2020  Patient Name:  Fatoumata Mukherjee    :  1985  MRN: [id number]  Physician: MAGNOLIA David                      Insurance: [place] Advantage 30 v  Medical Diagnosis: LLE ACL and meniscus repair             Rehab Codes: M25.562  Onset date: 20                   Next 's appt.:10/26/20  Visit# / total visits:    Cancels/No Shows: 0    Subjective:    Pain:  [x] Yes  [] No Location: LLE Knee   Pain Rating: (0-10 scale) 5/10  Pain altered Tx:  [] No  [] Yes  Action:  Comments: Patient arrived stating having a \"rough\" day today, notes has been complaint with HEP. Objective:  Precautions:  NWB LLE meniscal repair and ACL, brace locked in extension for mobilizing unlock to 90 for ROM  Exercises:  Exercise    LLE ACL/meniscus  DOS 20 Reps/ Time Weight/ Level Comments   Nustep L1 x10           Strap calf stretch 5x30\"       Strap HS stretch 5x30\"       Quad Sets / SLR 10'   [place] stim 10/20 40a, 5' QS 5' SLR   Heel slides x20   With stretch strap supine    LAQ stretch 10x10\"                                                   Other:LLE Patellar mobs, PROM into flexion   [place] Stim to L quad: 10' 10on/10off 40a. Gameready Vasocompression 34 deg medium pressure x 15 min     LLE Knee 10-2-20:  AROM: 20-70  PROM: 12-90    Knee Circumference:  Mid Patella 41  Supra Patella 41.5     Treatment Charges: Mins Units   []  Modalities     [x]  Ther Exercise 30 2   []  Manual Therapy     []  Ther Activities     []  Aquatics     [x]  Vasocompression 15 1   [x]  Other Russain 10 1   Total Treatment time 55 4       Assessment: [x] Progressing toward goals. Continued ROM progressions this date. Increased edema noted this visit. Able to perform AROM SLR this date during [place] stim.  Will continue progressions as

## 2020-10-07 ENCOUNTER — HOSPITAL ENCOUNTER (OUTPATIENT)
Dept: PHYSICAL THERAPY | Facility: CLINIC | Age: 35
Setting detail: THERAPIES SERIES
Discharge: HOME | End: 2020-10-07
Payer: MEDICARE

## 2020-10-07 PROCEDURE — 97110 THERAPEUTIC EXERCISES: CPT

## 2020-10-07 PROCEDURE — 97016 VASOPNEUMATIC DEVICE THERAPY: CPT

## 2020-10-07 NOTE — FLOWSHEET NOTE
[x] [facility]  Outpatient Rehabilitation &  Therapy  [facility]   [place]: [phone]  F: [phone]      Physical Therapy Daily Treatment Note    Date:  10/7/2020  Patient Name:  Jens Solorio    :  1985  MRN: [id number]  Physician: MAGNOLIA Wilson                      Insurance: [place] Advantage 30 v  Medical Diagnosis: LLE ACL and meniscus repair             Rehab Codes: M25.562  Onset date: 20                   Next Dr's appt.:10/26/20  Visit# / total visits:    Cancels/No Shows: 0    Subjective:    Pain:  [x] Yes  [] No Location: LLE Knee   Pain Rating: (0-10 scale) 0/10  Pain altered Tx:  [] No  [] Yes  Action:  Comments: Patient arrived stating he is doing better each day and is complaint with HEP. Objective:  Precautions:  NWB LLE meniscal repair and ACL, brace locked in extension for mobilizing unlock to 90 for ROM  Exercises:  Exercise    LLE ACL/meniscus  DOS 20 Reps/ Time Weight/ Level Comments   Nustep L1 x10           Strap calf stretch 5x30\"       Strap HS stretch 5x30\"       SLR 5'   Russian stim 10/10 45amp   Heel slides x20   With stretch strap supine    LAQ stretch 10x10\"                                                   Other:LLE Patellar mobs, PROM into flexion   [place] Stim to L quad: 5' 10on/10off 45a. Gameready Vasocompression 34 deg medium pressure x 15 min     LLE Knee 10-2-20:  AROM: 10-73   PROM: 12-90    Knee Circumference:  Mid Patella 41  Supra Patella 41.5     Treatment Charges: Mins Units   []  Modalities     [x]  Ther Exercise 30 2   []  Manual Therapy     []  Ther Activities     []  Aquatics     [x]  Vasocompression 15 1   [x]  Other Russain 5 0   Total Treatment time 50 3       Assessment: [x] Progressing toward goals. Pt with improved knee ROM, however, continued swelling, advised pt to increase frequency of icing at home to 2-3x a day. Able to complete active SLR with russian e-stim, quad lag noted.  Educated pt to prop pillow under ankle with quad sets to promote extension, pt understands. [] No change. [] Other:  [x] Patient would continue to benefit from skilled physical therapy services in order to: return to normal ROM, ADLs    STG/LTG:  STG: (to be met in 10 treatments)  1. ? Pain: Decrease pain levels to 3/10   2. ? ROM: Increase flexibility and AROM limitations throughout to equal bilat to reduce difficulty with ADLs  3. ? Strength: Increase LLE hip and knee strength to 5/5  4. Independent with Home Exercise Programs     LTG: (to be met in 20 treatments)  1. Improve score on assessment tool LEFS from 10/80 to 60/80 or better  2. Reduce pain levels to 0/10  3. Pt able to walk normal and perform stairs normally without valgus collapse to allow him to return to normal daily function without restriction      Pt. Education:  [x] Yes  [] No  [x] Reviewed Prior HEP/Ed  Method of Education: [x] Verbal  [x] Demo  [] Written  Comprehension of Education:  [x] Verbalizes understanding. [x] Demonstrates understanding. [x] Needs review. [] Demonstrates/verbalizes HEP/Ed previously given. Plan: [x] Continue current frequency toward long and short term goals.     [x] Specific Instructions for subsequent treatments: Continue focus on ROM, ESTIM next visit for quad       Time In: 11:10am              Time Out: 12:15pm    Electronically signed by:  Juaquin Rivera PTA

## 2020-10-09 ENCOUNTER — HOSPITAL ENCOUNTER (OUTPATIENT)
Dept: PHYSICAL THERAPY | Facility: CLINIC | Age: 35
Setting detail: THERAPIES SERIES
Discharge: HOME | End: 2020-10-09
Payer: MEDICARE

## 2020-10-09 PROCEDURE — 97016 VASOPNEUMATIC DEVICE THERAPY: CPT

## 2020-10-09 PROCEDURE — 97110 THERAPEUTIC EXERCISES: CPT

## 2020-10-09 NOTE — FLOWSHEET NOTE
[x] [facility]  Outpatient Rehabilitation &  Therapy  [facility]   [place]: [phone]  F: [phone]      Physical Therapy Daily Treatment Note    Date:  10/9/2020  Patient Name:  Joey Lund    :  1985  MRN: [id number]  Physician: MAGNOLIA Puente                      Insurance: [place] Advantage 30 [employer]  Medical Diagnosis: LLE ACL and meniscus repair             Rehab Codes: M25.562  Onset date: 20                   Next Dr's appt.:10/26/20  Visit# / total visits:    Cancels/No Shows: 0    Subjective:    Pain:  [x] Yes  [] No Location: LLE Knee   Pain Rating: (0-10 scale) 4/10  Pain altered Tx:  [x] No  [] Yes  Action:  Comments: Patient arrived today noting pain in his L knee. Pt. Reported last night getting up to use the restroom and one crutch getting caught on something and patient did bear weight through LLE to catch himself from falling. Patient reported that pain was present in LLE after putting weight through it. Objective:  Precautions:  NWB LLE meniscal repair and ACL, brace locked in extension for mobilizing unlock to 90 for ROM  Exercises:  Exercise    LLE ACL/meniscus  DOS 20 Reps/ Time Weight/ Level Comments   Nustep L1 x10           Strap calf stretch 5x30\"       Strap HS stretch 5x30\"       SLR x15  AROM    Heel slides x20   With stretch strap supine    LAQ stretch 10x10\"                                                   Other:LLE Patellar mobs, PROM into flexion   Gameready Vasocompression 34 deg medium pressure x 15 min     LLE Knee 10-2-20:  AROM: 10-73   PROM: 12-90    Knee Circumference:  Mid Patella 41  Supra Patella 41.5     Treatment Charges: Mins Units   []  Modalities     [x]  Ther Exercise 30 2   []  Manual Therapy     []  Ther Activities     []  Aquatics     [x]  Vasocompression 10 1   []  Other Russain     Total Treatment time 40 3       Assessment: [x] Progressing toward goals.  Patient completed all exercises today with no major issues. After the last rep of the LAQ stretch patient noted a sharp pain in L knee area. Patient completed AROM SLR's today without assistance from e-stim. Finished today's session with vasocompression for 10 minutes, med. Pressure, 34 degrees. [] No change. [] Other:  [x] Patient would continue to benefit from skilled physical therapy services in order to: return to normal ROM, ADLs    STG/LTG:  STG: (to be met in 10 treatments)  1. ? Pain: Decrease pain levels to 3/10   2. ? ROM: Increase flexibility and AROM limitations throughout to equal bilat to reduce difficulty with ADLs  3. ? Strength: Increase LLE hip and knee strength to 5/5  4. Independent with Home Exercise Programs     LTG: (to be met in 20 treatments)  1. Improve score on assessment tool LEFS from 10/80 to 60/80 or better  2. Reduce pain levels to 0/10  3. Pt able to walk normal and perform stairs normally without valgus collapse to allow him to return to normal daily function without restriction      Pt. Education:  [x] Yes  [] No  [x] Reviewed Prior HEP/Ed  Method of Education: [x] Verbal  [x] Demo  [] Written  Comprehension of Education:  [x] Verbalizes understanding. [x] Demonstrates understanding. [x] Needs review. [] Demonstrates/verbalizes HEP/Ed previously given. Plan: [x] Continue current frequency toward long and short term goals.     [x] Specific Instructions for subsequent treatments: Continue focus on ROM, ESTIM next visit for quad       Time In: 1410              Time Out: 1515    Electronically signed by:  Nagi Myers PTA

## 2020-10-12 ENCOUNTER — HOSPITAL ENCOUNTER (OUTPATIENT)
Dept: PHYSICAL THERAPY | Facility: CLINIC | Age: 35
Setting detail: THERAPIES SERIES
Discharge: HOME | End: 2020-10-12
Payer: MEDICARE

## 2020-10-12 PROCEDURE — 97110 THERAPEUTIC EXERCISES: CPT

## 2020-10-12 PROCEDURE — 97016 VASOPNEUMATIC DEVICE THERAPY: CPT

## 2020-10-12 NOTE — FLOWSHEET NOTE
[x] [facility]  Outpatient Rehabilitation &  Therapy  [facility]   [place]: [phone]  F: [phone]      Physical Therapy Daily Treatment Note    Date:  10/12/2020  Patient Name:  Sharona Brody    :  1985  MRN: [id number]  Physician: MAGNOLIA Miller                      Insurance: [place] Advantage 30 v  Medical Diagnosis: LLE ACL and meniscus repair             Rehab Codes: M25.562  Onset date: 20                   Next Dr's appt.:10/26/20  Visit# / total visits:    Cancels/No Shows: 0    Subjective:    Pain:  [x] Yes  [] No Location: LLE Knee   Pain Rating: (0-10 scale) 5/10  Pain altered Tx:  [x] No  [] Yes  Action:  Comments: Patient arrived today noting pain in his L knee. Pt reports feeling the pain on the medial and lateral sides of his L knee. Objective:  Precautions:  NWB LLE meniscal repair and ACL, brace locked in extension for mobilizing unlock to 90 for ROM  Exercises:  Exercise    LLE ACL/meniscus  DOS 20 Reps/ Time Weight/ Level Comments   Nustep L1 x10           Strap calf stretch 5x30\"       Strap HS stretch 5x30\"       SLR x15  AROM    Heel slides x20   With stretch strap supine    LAQ stretch 10x10\"           Prone Hang 3'                                       Other:LLE Patellar mobs, PROM into flexion   Gameready Vasocompression 34 deg medium pressure x 15 min     LLE Knee (flexion only) 10-12-20:  AROM: 60  PROM: 75        Treatment Charges: Mins Units   []  Modalities     [x]  Ther Exercise 40 3   []  Manual Therapy     []  Ther Activities     []  Aquatics     [x]  Vasocompression 15 1   []  Other Russain     Total Treatment time 55 4       Assessment: [x] Progressing toward goals. Patient completed all exercises today with continued pain noted ROM exercises. Prone hang added today no major issues noted. Pt. had difficulties with bed mobility. Stating has not turned over on stomach without assistance since surgery.  LLE active and passive ROM measurements taken today for flexion limited by patients pain and guarding. Pt. finished today's session with vasocompression 15 min., med. pressure, 34 degrees. [] No change. [] Other:  [x] Patient would continue to benefit from skilled physical therapy services in order to: return to normal ROM, ADLs      STG/LTG:  STG: (to be met in 10 treatments)  1. ? Pain: Decrease pain levels to 3/10   2. ? ROM: Increase flexibility and AROM limitations throughout to equal bilat to reduce difficulty with ADLs  3. ? Strength: Increase LLE hip and knee strength to 5/5  4. Independent with Home Exercise Programs     LTG: (to be met in 20 treatments)  1. Improve score on assessment tool LEFS from 10/80 to 60/80 or better  2. Reduce pain levels to 0/10  3. Pt able to walk normal and perform stairs normally without valgus collapse to allow him to return to normal daily function without restriction      Pt. Education:  [x] Yes  [] No  [x] Reviewed Prior HEP/Ed  Method of Education: [x] Verbal  [x] Demo  [] Written  Comprehension of Education:  [x] Verbalizes understanding. [x] Demonstrates understanding. [x] Needs review. [] Demonstrates/verbalizes HEP/Ed previously given. Plan: [x] Continue current frequency toward long and short term goals. [x] Specific Instructions for subsequent treatments: Continue focus on ROM.        Time In: 1045             Time Out: 1150    Electronically signed by:  Scot Hurt PTA

## 2020-10-14 ENCOUNTER — HOSPITAL ENCOUNTER (OUTPATIENT)
Dept: PHYSICAL THERAPY | Facility: CLINIC | Age: 35
Setting detail: THERAPIES SERIES
Discharge: HOME | End: 2020-10-14
Payer: MEDICARE

## 2020-10-14 PROCEDURE — 97016 VASOPNEUMATIC DEVICE THERAPY: CPT

## 2020-10-14 PROCEDURE — 97110 THERAPEUTIC EXERCISES: CPT

## 2020-10-16 ENCOUNTER — HOSPITAL ENCOUNTER (OUTPATIENT)
Dept: PHYSICAL THERAPY | Facility: CLINIC | Age: 35
Setting detail: THERAPIES SERIES
Discharge: HOME | End: 2020-10-16
Payer: MEDICARE

## 2020-10-16 PROCEDURE — 97110 THERAPEUTIC EXERCISES: CPT

## 2020-10-16 PROCEDURE — 97016 VASOPNEUMATIC DEVICE THERAPY: CPT

## 2020-10-16 NOTE — FLOWSHEET NOTE
vasocompression for 15 minutes, med. Pressure, 34 degrees. [] No change. [] Other:      STG/LTG:  STG: (to be met in 10 treatments)  1. ? Pain: Decrease pain levels to 3/10   2. ? ROM: Increase flexibility and AROM limitations throughout to equal bilat to reduce difficulty with ADLs  3. ? Strength: Increase LLE hip and knee strength to 5/5  4. Independent with Home Exercise Programs     LTG: (to be met in 20 treatments)  1. Improve score on assessment tool LEFS from 10/80 to 60/80 or better  2. Reduce pain levels to 0/10  3. Pt able to walk normal and perform stairs normally without valgus collapse to allow him to return to normal daily function without restriction      Pt. Education:  [x] Yes  [] No  [x] Reviewed Prior HEP/Ed  Method of Education: [x] Verbal  [x] Demo  [] Written  Comprehension of Education:  [x] Verbalizes understanding. [x] Demonstrates understanding. [x] Needs review. [] Demonstrates/verbalizes HEP/Ed previously given. Plan: [x] Continue current frequency toward long and short term goals. [x] Specific Instructions for subsequent treatments: Continue focus on ROM.        Time In: 1035             Time Out: 1135    Electronically signed by:  Pancho Tamayo PTA

## 2020-10-19 ENCOUNTER — HOSPITAL ENCOUNTER (OUTPATIENT)
Dept: PHYSICAL THERAPY | Facility: CLINIC | Age: 35
Setting detail: THERAPIES SERIES
Discharge: HOME | End: 2020-10-19
Payer: MEDICARE

## 2020-10-19 PROCEDURE — 97110 THERAPEUTIC EXERCISES: CPT

## 2020-10-19 PROCEDURE — 97016 VASOPNEUMATIC DEVICE THERAPY: CPT

## 2020-10-19 NOTE — FLOWSHEET NOTE
[x] [facility]  Outpatient Rehabilitation &  Therapy  [facility]   [place]: [phone]  F: [phone]      Physical Therapy Daily Treatment Note    Date:  10/19/2020  Patient Name:  Eugene Kebede    :  1985  MRN: [id number]  Physician: MAGNOLIA Meza                      Insurance: [place] Advantage 30 v  Medical Diagnosis: LLE ACL and meniscus repair             Rehab Codes: M25.562  Onset date: 20                   Next Dr's appt.:10/26/20  Visit# / total visits: 10/20   Cancels/No Shows: 0    Subjective:    Pain:  [x] Yes  [] No Location: LLE Knee   Pain Rating: (0-10 scale) 5/10  Pain altered Tx:  [x] No  [] Yes  Action:  Comments: Patient arrived today noting pain of 4-5/10 in the knee and into the calf of his left leg. Reports adherence to HEP       Objective:  Precautions:  NWB LLE meniscal repair and ACL, brace locked in extension for mobilizing unlock to 90 for ROM  Exercises:  Exercise    LLE ACL/meniscus  DOS 20 Reps/ Time Weight/ Level Comments   Nustep L1 x10           Strap calf stretch 5x30\"       Strap HS stretch 5x30\"       SLR x20  AROM    Heel slides x20   With stretch strap supine    LAQ stretch 10x10\"           Prone Hang 5' 5#                                      Other: PROM into flexion   Gameready Vasocompression 34 deg medium pressure x 15 min LLE    (self) to LLE quad in seated position      LLE Knee 10-19-20:  AROM: 10-78  PROM: 3-90         Treatment Charges: Mins Units   []  Modalities     [x]  Ther Exercise 35 2   []  Manual Therapy     []  Ther Activities     []  Aquatics     [x]  Vasocompression 15 1   []  Other Russain     Total Treatment time 50 3       Assessment: [x] Progressing toward goals. Patient tolerated session well today, less pain overall per patient. Most of the pain continues to be at end-range flexion. Pain with prone hang as well. Finished session with vasocompression for 15 minutes, med.  Pressure, 34 degrees. [] No change. [] Other:      STG/LTG:  STG: (to be met in 10 treatments)  1. ? Pain: Decrease pain levels to 3/10   2. ? ROM: Increase flexibility and AROM limitations throughout to equal bilat to reduce difficulty with ADLs  3. ? Strength: Increase LLE hip and knee strength to 5/5  4. Independent with Home Exercise Programs     LTG: (to be met in 20 treatments)  1. Improve score on assessment tool LEFS from 10/80 to 60/80 or better  2. Reduce pain levels to 0/10  3. Pt able to walk normal and perform stairs normally without valgus collapse to allow him to return to normal daily function without restriction      Pt. Education:  [x] Yes  [] No  [x] Reviewed Prior HEP/Ed  Method of Education: [x] Verbal  [x] Demo  [] Written  Comprehension of Education:  [x] Verbalizes understanding. [x] Demonstrates understanding. [x] Needs review. [] Demonstrates/verbalizes HEP/Ed previously given. Plan: [x] Continue current frequency toward long and short term goals. [x] Specific Instructions for subsequent treatments: Continue focus on ROM.        Time In: 1100           Time Out: 1155    Electronically signed by:  Brisa Blum, PT

## 2020-10-21 ENCOUNTER — HOSPITAL ENCOUNTER (OUTPATIENT)
Dept: PHYSICAL THERAPY | Facility: CLINIC | Age: 35
Setting detail: THERAPIES SERIES
Discharge: HOME | End: 2020-10-21
Payer: MEDICARE

## 2020-10-21 PROCEDURE — 97110 THERAPEUTIC EXERCISES: CPT

## 2020-10-21 PROCEDURE — 97016 VASOPNEUMATIC DEVICE THERAPY: CPT

## 2020-10-21 NOTE — FLOWSHEET NOTE
at home to reduce tightness. [] No change. [] Other:      STG/LTG:  STG: (to be met in 10 treatments)  1. ? Pain: Decrease pain levels to 3/10   2. ? ROM: Increase flexibility and AROM limitations throughout to equal bilat to reduce difficulty with ADLs  3. ? Strength: Increase LLE hip and knee strength to 5/5  4. Independent with Home Exercise Programs     LTG: (to be met in 20 treatments)  1. Improve score on assessment tool LEFS from 10/80 to 60/80 or better  2. Reduce pain levels to 0/10  3. Pt able to walk normal and perform stairs normally without valgus collapse to allow him to return to normal daily function without restriction      Pt. Education:  [x] Yes  [] No  [x] Reviewed Prior HEP/Ed  Method of Education: [x] Verbal  [x] Demo  [] Written  Comprehension of Education:  [x] Verbalizes understanding. [x] Demonstrates understanding. [x] Needs review. [] Demonstrates/verbalizes HEP/Ed previously given. Plan: [x] Continue current frequency toward long and short term goals. [x] Specific Instructions for subsequent treatments: Continue focus on ROM.        Time In: 1100           Time Out: 1208    Electronically signed by:  Dulce Luther PTA

## 2020-10-23 ENCOUNTER — APPOINTMENT (OUTPATIENT)
Dept: PHYSICAL THERAPY | Facility: CLINIC | Age: 35
End: 2020-10-23
Payer: MEDICARE

## 2020-10-26 ENCOUNTER — OFFICE VISIT (OUTPATIENT)
Dept: ORTHOPEDIC SURGERY | Age: 35
End: 2020-10-26

## 2020-10-26 ENCOUNTER — APPOINTMENT (OUTPATIENT)
Dept: PHYSICAL THERAPY | Facility: CLINIC | Age: 35
End: 2020-10-26
Payer: MEDICARE

## 2020-10-26 VITALS
TEMPERATURE: 96.4 F | HEART RATE: 88 BPM | SYSTOLIC BLOOD PRESSURE: 114 MMHG | WEIGHT: 177 LBS | BODY MASS INDEX: 24.78 KG/M2 | DIASTOLIC BLOOD PRESSURE: 74 MMHG | HEIGHT: 71 IN

## 2020-10-26 PROCEDURE — 99024 POSTOP FOLLOW-UP VISIT: CPT | Performed by: ORTHOPAEDIC SURGERY

## 2020-10-26 ASSESSMENT — ENCOUNTER SYMPTOMS
ABDOMINAL DISTENTION: 0
ABDOMINAL PAIN: 0
NAUSEA: 0
CHEST TIGHTNESS: 0
COLOR CHANGE: 0
APNEA: 0
DIARRHEA: 0
CONSTIPATION: 0
COUGH: 0
VOMITING: 0
SHORTNESS OF BREATH: 0

## 2020-10-26 NOTE — PROGRESS NOTES
[facility]  [address]  [facility] [address]  Dept: [phone]  Dept Fax: [phone]        Post Operative Follow Up    Subjective:     Chief Complaint   Patient presents with    Knee Pain     Left knee scope DOS- 9/22/20       Post Op Surgery:     The patient is here for a follow up after having a left knee arthroscopy with medial meniscal repair and ACL reconstruction. The date of surgery was on 9/22/2020 . Therefore we are 4 weeks post op. Currently the patient's pain is controlled with percocet. He is only taking half a tablet before PT. Physical therapy was started. Patient presents today with a crutches and brace that is in good repair. Review of Systems   Constitutional: Positive for activity change. Negative for appetite change. Respiratory: Negative for apnea, cough, chest tightness and shortness of breath. Cardiovascular: Negative for chest pain, palpitations and leg swelling. Gastrointestinal: Negative for abdominal distention, abdominal pain, constipation, diarrhea, nausea and vomiting. Genitourinary: Negative for difficulty urinating, dysuria and hematuria. Musculoskeletal: Positive for gait problem. Negative for arthralgias, joint swelling and myalgias. Skin: Negative for color change and rash. Neurological: Negative for dizziness, weakness, numbness and headaches. Psychiatric/Behavioral: Negative for sleep disturbance. I have reviewed the CC, HPI, ROS, PMH, FHX, Social History, and if not present in this note, I have reviewed in the patient's chart. I agree with the documentation provided by other staff and have reviewed their documentation prior to providing my signature indicating agreement. Vitals:   /74   Pulse 88   Temp 96.4 °F (35.8 °C)   Ht 5' 11\" (1.803 m)   Wt 177 lb (80.3 kg)   BMI 24.69 kg/m²  Body mass index is 24.69 kg/m².   Physical Examination: with any problems that may arise. No orders of the defined types were placed in this encounter. Orders Placed This Encounter   Procedures   [facility]     Referral Priority:   Routine     Referral Type:   Eval and Treat     Referral Reason:   Specialty Services Required     Requested Specialty:   Physical Therapy     Number of Visits Requested:   1       I, Deanne Nissen, MS, AT, ATC, am scribing for and in the presence of Elsy NEWELL. 11/1/2020  6:42 PM    Electronically signed by Elaine Gomez DO, on 11/1/2020 at 6:42 PM       IElsy DO, have personally seen this patient and I have reviewed the CC, PMH, FHX and Social History as provided by other clinical staff. I reassessed the HPI and ROS as scribed by Deanne Nissen MS AT [facility] in my presence and it is both accurate and complete. Thereafter, I personally performed the PE, reviewed the imaging and established the DX and POC. I agree with the documentation provided by the . I have reviewed all documentation in its entirety prior to providing my signature indicating agreement. Any areas of disagreement are noted on the chart.     Electronically signed by Elaine Gomez DO on 11/1/2020 at 6:42 PM

## 2020-10-28 ENCOUNTER — HOSPITAL ENCOUNTER (OUTPATIENT)
Dept: PHYSICAL THERAPY | Facility: CLINIC | Age: 35
Setting detail: THERAPIES SERIES
Discharge: HOME | End: 2020-10-28
Payer: MEDICARE

## 2020-10-28 PROCEDURE — 97110 THERAPEUTIC EXERCISES: CPT

## 2020-10-28 PROCEDURE — 97016 VASOPNEUMATIC DEVICE THERAPY: CPT

## 2020-10-28 NOTE — FLOWSHEET NOTE
follow through with WBAT knee brace locked in extension so he can use his quad in function to regain strength. Still demonstrates significant quad lag    [] No change. [] Other:      STG/LTG:  STG: (to be met in 10 treatments)  1. ? Pain: Decrease pain levels to 3/10   2. ? ROM: Increase flexibility and AROM limitations throughout to equal bilat to reduce difficulty with ADLs  3. ? Strength: Increase LLE hip and knee strength to 5/5  4. Independent with Home Exercise Programs     LTG: (to be met in 20 treatments)  1. Improve score on assessment tool LEFS from 10/80 to 60/80 or better  2. Reduce pain levels to 0/10  3. Pt able to walk normal and perform stairs normally without valgus collapse to allow him to return to normal daily function without restriction      Pt. Education:  [x] Yes  [] No  [x] Reviewed Prior HEP/Ed  Method of Education: [x] Verbal  [x] Demo  [] Written  Comprehension of Education:  [x] Verbalizes understanding. [x] Demonstrates understanding. [x] Needs review. [] Demonstrates/verbalizes HEP/Ed previously given. Plan: [x] Continue current frequency toward long and short term goals. [x] Specific Instructions for subsequent treatments: Continue focus on ROM.        Time In: 1105          Time Out: 1210    Electronically signed by:  Prasad Richey PT

## 2020-10-30 ENCOUNTER — HOSPITAL ENCOUNTER (OUTPATIENT)
Dept: PHYSICAL THERAPY | Facility: CLINIC | Age: 35
Setting detail: THERAPIES SERIES
Discharge: HOME | End: 2020-10-30
Payer: MEDICARE

## 2020-10-30 PROCEDURE — 97110 THERAPEUTIC EXERCISES: CPT

## 2020-10-30 PROCEDURE — 97016 VASOPNEUMATIC DEVICE THERAPY: CPT

## 2020-10-30 NOTE — FLOWSHEET NOTE
[x] [facility]  Outpatient Rehabilitation &  Therapy  [facility]   [place]: [phone]  F: [phone]      Physical Therapy Daily Treatment Note    Date:  10/30/2020  Patient Name:  Jens Solorio    :  1985  MRN: [id number]  Physician: MAGNOLIA Wilson                      Insurance: [place] Advantage 30 [employer]  Medical Diagnosis: LLE ACL and meniscus repair             Rehab Codes: M25.562  Onset date: 20                   Next Dr's appt.:1  Visit# / total visits:    Cancels/No Shows: 0    Subjective:    Pain:  [x] Yes  [] No Location: LLE Knee   Pain Rating: (0-10 scale) 5/10  Pain altered Tx:  [x] No  [] Yes  Action:  Comments: Patient arrived noting continued pain and stiffness. Objective:  Precautions:  WBAT with brace locked in extension until quad is stronger. Goal to DC brace and crutches by 20  Exercises:  Exercise    LLE ACL/meniscus  DOS 20 Reps/ Time Weight/ Level Comments   Nustep L1 x10           Strap calf stretch 5x30\"       Strap HS stretch 5x30\"       SLR x30  AROM    Hip abd 2x10     Hip ext 2x10     Heel slides x20   With stretch strap supine    LAQ stretch 10x10\"           Prone Hang 5' 5#          4 way hip x15 orange OKC   TKE 10x10\" Blue    TG Squat 2x10 L20              Other: PROM into flexion   Gameready Vasocompression 34 deg medium pressure x 10 min LLE     Next Visit: Continue to progress exercises now that pt is WBAT     LLE Knee (flexion only) 10-28-20:  AROM: 90  PROM: 95         Treatment Charges: Mins Units   []  Modalities     [x]  Ther Exercise 35 2   [x]  Manual Therapy 5 -   []  Ther Activities     []  Aquatics     [x]  Vasocompression 10 1   []  Other Russain     Total Treatment time 50 3       Assessment: [x] Progressing toward goals. [] No change. [x] Other: Progressed strength and ROM program this visit. Patient notes tightness and pain throughout treatment.  Issued handout and tband for HEP this visit. Discussed importance of weightbearing with ambulation. STG/LTG:  STG: (to be met in 10 treatments)  1. ? Pain: Decrease pain levels to 3/10   2. ? ROM: Increase flexibility and AROM limitations throughout to equal bilat to reduce difficulty with ADLs  3. ? Strength: Increase LLE hip and knee strength to 5/5  4. Independent with Home Exercise Programs     LTG: (to be met in 20 treatments)  1. Improve score on assessment tool LEFS from 10/80 to 60/80 or better  2. Reduce pain levels to 0/10  3. Pt able to walk normal and perform stairs normally without valgus collapse to allow him to return to normal daily function without restriction      Pt. Education:  [x] Yes  [] No  [x] Reviewed Prior HEP/Ed  Method of Education: [x] Verbal  [x] Demo  [] Written  Comprehension of Education:  [x] Verbalizes understanding. [x] Demonstrates understanding. [x] Needs review. [] Demonstrates/verbalizes HEP/Ed previously given. Plan: [x] Continue current frequency toward long and short term goals. [x] Specific Instructions for subsequent treatments: Continue focus on ROM.        Time In: 1100          Time Out: 1200    Electronically signed by:  Dominga Mcduffie PTA

## 2020-11-02 ENCOUNTER — HOSPITAL ENCOUNTER (OUTPATIENT)
Dept: PHYSICAL THERAPY | Facility: CLINIC | Age: 35
Setting detail: THERAPIES SERIES
Discharge: HOME | End: 2020-11-02
Payer: MEDICARE

## 2020-11-02 PROCEDURE — 97110 THERAPEUTIC EXERCISES: CPT

## 2020-11-02 PROCEDURE — 97016 VASOPNEUMATIC DEVICE THERAPY: CPT

## 2020-11-02 NOTE — FLOWSHEET NOTE
[x] THE [facility] &  Therapy  [facility]   [place]: [phone]  F: [phone]      Physical Therapy Daily Treatment Note    Date:  2020  Patient Name:  Roya Cast    :  1985  MRN: [id number]  Physician: MAGNOLIA Mascorro                      Insurance: [place] Advantage 30 v  Medical Diagnosis: LLE ACL and meniscus repair             Rehab Codes: M25.562  Onset date: 20                   Next 's appt. : 20  Visit# / total visits:    Cancels/No Shows: 0    Subjective:    Pain:  [x] Yes  [] No Location: LLE Knee   Pain Rating: (0-10 scale) 5/10  Pain altered Tx:  [x] No  [] Yes  Action:  Comments: Patient arrived noting continued pain and stiffness. Objective:  Precautions:  WBAT with brace locked in extension until quad is stronger. Goal to DC brace and crutches by 20  Exercises:  Exercise    LLE ACL/meniscus  DOS 20 Reps/ Time Weight/ Level Comments   Nustep L1 x10           Strap calf stretch 5x30\"       Strap HS stretch 5x30\"       SLR x30  AROM    Hip abd 2x10     Hip ext 2x10     Heel slides x20   With stretch strap supine    LAQ stretch 10x10\"           Prone Hang 5' 5#          4 way hip x15 orange    TKE 10x10\" Blue    TG Squat 2x10 L20              Other: PROM into flexion   Gameready Vasocompression 34 deg medium pressure x 10 min LLE     Next Visit: Continue to progress exercises now that pt is WBAT     LLE Knee (flexion only) 20:   AROM: 95  PROM: 110      Treatment Charges: Mins Units   []  Modalities     [x]  Ther Exercise 35 2   [x]  Manual Therapy 5 -   []  Ther Activities     []  Aquatics     [x]  Vasocompression 10 1   []  Other Russain     Total Treatment time 50 3       Assessment: [x] Progressing toward goals. [] No change. [x] Other: Continued strength and weightbearing progressions this date. Continued focus needed on weightbearing. Improved ROM noted this date. STG/LTG:  STG: (to be met in 10 treatments)  1. ? Pain: Decrease pain levels to 3/10   2. ? ROM: Increase flexibility and AROM limitations throughout to equal bilat to reduce difficulty with ADLs  3. ? Strength: Increase LLE hip and knee strength to 5/5  4. Independent with Home Exercise Programs     LTG: (to be met in 20 treatments)  1. Improve score on assessment tool LEFS from 10/80 to 60/80 or better  2. Reduce pain levels to 0/10  3. Pt able to walk normal and perform stairs normally without valgus collapse to allow him to return to normal daily function without restriction      Pt. Education:  [x] Yes  [] No  [x] Reviewed Prior HEP/Ed  Method of Education: [x] Verbal  [x] Demo  [] Written  Comprehension of Education:  [x] Verbalizes understanding. [x] Demonstrates understanding. [x] Needs review. [] Demonstrates/verbalizes HEP/Ed previously given. Plan: [x] Continue current frequency toward long and short term goals. [x] Specific Instructions for subsequent treatments: Continue focus on ROM.        Time In: 1100          Time Out: 1200    Electronically signed by:  Abram Malagon PTA

## 2020-11-05 ENCOUNTER — HOSPITAL ENCOUNTER (OUTPATIENT)
Dept: PHYSICAL THERAPY | Facility: CLINIC | Age: 35
Setting detail: THERAPIES SERIES
Discharge: HOME | End: 2020-11-05
Payer: MEDICARE

## 2020-11-05 NOTE — FLOWSHEET NOTE
[] [facility]) - [facility] &  Therapy  [address]    [phone]  F: [phone]   [] [address]  [facility]   [address]  P: [phone]  F: [phone]  [] [address] &  Therapy  [address] [facility]  P: [phone]  F: [phone] [] [address]  P: [phone]  F: [phone]  [x] [address]  [address] [address] [facility] [address]   [address]   [place]: [phone]  F: [phone]   [] [facility]  [address] [facility] [address]  [place]: [phone]   F: [phone]     Physical Therapy Cancel/No Show note    Date: 2020  Patient: Karla Gonzales  : 1985  MRN: [id number]    Cancels/No Shows to date: 1    For today's appointment patient:    []  Cancelled    [] Rescheduled appointment    [x] No-show     Reason given by patient:    []  Patient ill    []  Conflicting appointment    [] No transportation      [] Conflict with work    [] No reason given    [] Weather related    [] [id number]    [] Other:      Comments:        [] Next appointment was confirmed    Electronically signed by: Zuhair Huber

## 2020-11-06 ENCOUNTER — HOSPITAL ENCOUNTER (OUTPATIENT)
Dept: PHYSICAL THERAPY | Facility: CLINIC | Age: 35
Setting detail: THERAPIES SERIES
Discharge: HOME | End: 2020-11-06
Payer: MEDICARE

## 2020-11-06 PROCEDURE — 97116 GAIT TRAINING THERAPY: CPT

## 2020-11-06 PROCEDURE — 97110 THERAPEUTIC EXERCISES: CPT

## 2020-11-06 PROCEDURE — 97016 VASOPNEUMATIC DEVICE THERAPY: CPT

## 2020-11-06 NOTE — FLOWSHEET NOTE
[x] THE [facility] &  Therapy  [facility]   [place]: [phone]  F: [phone]      Physical Therapy Daily Treatment Note    Date:  2020  Patient Name:  Bina Thayer    :  1985  MRN: [id number]  Physician: MAGNOLIA Norris                      Insurance: [place] Advantage 30 [employer]  Medical Diagnosis: LLE ACL and meniscus repair             Rehab Codes: M25.562  Onset date: 20                   Next 's appt. : 20  Visit# / total visits:    Cancels/No Shows: 0    Subjective:    Pain:  [x] Yes  [] No Location: LLE Knee   Pain Rating: (0-10 scale) 5/10  Pain altered Tx:  [x] No  [] Yes  Action:  Comments: Patient arrived stating he drove himself to therapy because he did not have a ride. Pt states his knee is coming along. Objective:  Precautions:  WBAT with brace locked in extension until quad is stronger.   Goal to DC brace and crutches by 20  Exercises:  Exercise    LLE ACL/meniscus  DOS 20 Reps/ Time Weight/ Level Comments    Nustep L3 x10   x          SB S 3x30\"   x   HS S 3x30\"   x          *Weight shifts  3'   x   *Tandem stance shifts   3'   x   Gait train in // bars 5L/5L  Bilateral UE support/RUE support  x   4-way hip  x15 Orange   x   TKE 20x5\" Blue   x   TGym squat/HR 2x10 L20  x          Strap calf stretch 5x30\"        Strap HS stretch 5x30\"        SLR x30  AROM     Hip abd 2x10      Hip ext 2x10      Heel slides x20   With stretch strap supine     LAQ stretch 10x10\"             Prone Hang 5' 5#  x                                          Other:  Gameready Vasocompression 34 deg medium pressure x 10 min LLE       LLE Knee (flexion only) 20:   AROM: 95  PROM: 110      Treatment Charges: Mins Units   []  Modalities     [x]  Ther Exercise 45 3   []  Manual Therapy     []  Ther Activities     []  Aquatics     [x]  Vasocompression 10 1   [x]  Other Gait 10 1   Total Treatment time 65 5 Assessment: [x] Progressing toward goals. [] No change. [x] Other: Completed all exercises marked above. Educated and demonstrated proper gait pattern with unilateral crutch, pt performs with fair carryover with increased cueing to shift weight during swing through phase of RLE. Advised pt to only use one crutch for balance and practice weight shifting while walking, pt understands. Pt required increased time to complete all exercises due to poor standing tolerance. Ended with vaso per pt request.      STG/LTG:  STG: (to be met in 10 treatments)  1. ? Pain: Decrease pain levels to 3/10   2. ? ROM: Increase flexibility and AROM limitations throughout to equal bilat to reduce difficulty with ADLs  3. ? Strength: Increase LLE hip and knee strength to 5/5  4. Independent with Home Exercise Programs     LTG: (to be met in 20 treatments)  1. Improve score on assessment tool LEFS from 10/80 to 60/80 or better  2. Reduce pain levels to 0/10  3. Pt able to walk normal and perform stairs normally without valgus collapse to allow him to return to normal daily function without restriction      Pt. Education:  [x] Yes  [] No  [x] Reviewed Prior HEP/Ed  Method of Education: [x] Verbal  [x] Demo  [] Written  Comprehension of Education:  [x] Verbalizes understanding. [x] Demonstrates understanding. [x] Needs review. [] Demonstrates/verbalizes HEP/Ed previously given. Plan: [x] Continue current frequency toward long and short term goals. [x] Specific Instructions for subsequent treatments: Continue focus on ROM.        Time In: 9:10am         Time Out: 10:28am    Electronically signed by:  Madhu Guerra PTA

## 2020-11-09 ENCOUNTER — HOSPITAL ENCOUNTER (OUTPATIENT)
Dept: PHYSICAL THERAPY | Facility: CLINIC | Age: 35
Setting detail: THERAPIES SERIES
Discharge: HOME | End: 2020-11-09
Payer: MEDICARE

## 2020-11-09 PROCEDURE — 97110 THERAPEUTIC EXERCISES: CPT

## 2020-11-09 NOTE — FLOWSHEET NOTE
with weightbearing exercises with improved ROM noted. Will continue functional progressions with continued focus on weaning out of brace. STG/LTG:  STG: (to be met in 10 treatments)  1. ? Pain: Decrease pain levels to 3/10   2. ? ROM: Increase flexibility and AROM limitations throughout to equal bilat to reduce difficulty with ADLs  3. ? Strength: Increase LLE hip and knee strength to 5/5  4. Independent with Home Exercise Programs     LTG: (to be met in 20 treatments)  1. Improve score on assessment tool LEFS from 10/80 to 60/80 or better  2. Reduce pain levels to 0/10  3. Pt able to walk normal and perform stairs normally without valgus collapse to allow him to return to normal daily function without restriction      Pt. Education:  [x] Yes  [] No  [x] Reviewed Prior HEP/Ed  Method of Education: [x] Verbal  [x] Demo  [] Written  Comprehension of Education:  [x] Verbalizes understanding. [x] Demonstrates understanding. [x] Needs review. [] Demonstrates/verbalizes HEP/Ed previously given. Plan: [x] Continue current frequency toward long and short term goals. [x] Specific Instructions for subsequent treatments: Continue focus on ROM.        Time In: 1100           Time Out: 1200    Electronically signed by:  Lee Cronin PTA

## 2020-11-12 ENCOUNTER — HOSPITAL ENCOUNTER (OUTPATIENT)
Dept: PHYSICAL THERAPY | Facility: CLINIC | Age: 35
Setting detail: THERAPIES SERIES
Discharge: HOME | End: 2020-11-12
Payer: MEDICARE

## 2020-11-12 PROCEDURE — 97110 THERAPEUTIC EXERCISES: CPT

## 2020-11-12 PROCEDURE — 97116 GAIT TRAINING THERAPY: CPT

## 2020-11-12 NOTE — FLOWSHEET NOTE
and proper gait. Will continue gait and strength progressions. STG/LTG:  STG: (to be met in 10 treatments)  1. ? Pain: Decrease pain levels to 3/10   2. ? ROM: Increase flexibility and AROM limitations throughout to equal bilat to reduce difficulty with ADLs  3. ? Strength: Increase LLE hip and knee strength to 5/5  4. Independent with Home Exercise Programs     LTG: (to be met in 20 treatments)  1. Improve score on assessment tool LEFS from 10/80 to 60/80 or better  2. Reduce pain levels to 0/10  3. Pt able to walk normal and perform stairs normally without valgus collapse to allow him to return to normal daily function without restriction      Pt. Education:  [x] Yes  [] No  [x] Reviewed Prior HEP/Ed  Method of Education: [x] Verbal  [x] Demo  [] Written  Comprehension of Education:  [x] Verbalizes understanding. [x] Demonstrates understanding. [x] Needs review. [] Demonstrates/verbalizes HEP/Ed previously given. Plan: [x] Continue current frequency toward long and short term goals. [x] Specific Instructions for subsequent treatments: Continue focus on ROM.        Time In: 1100           Time Out: 1200    Electronically signed by:  Adelaida Horne PTA

## 2020-11-16 ENCOUNTER — HOSPITAL ENCOUNTER (OUTPATIENT)
Dept: PHYSICAL THERAPY | Facility: CLINIC | Age: 35
Setting detail: THERAPIES SERIES
Discharge: HOME | End: 2020-11-16
Payer: MEDICARE

## 2020-11-16 PROCEDURE — 97110 THERAPEUTIC EXERCISES: CPT

## 2020-11-16 NOTE — FLOWSHEET NOTE
with brace unlocked and to discontinue use of crutch. STG/LTG:  STG: (to be met in 10 treatments)  1. ? Pain: Decrease pain levels to 3/10   2. ? ROM: Increase flexibility and AROM limitations throughout to equal bilat to reduce difficulty with ADLs  3. ? Strength: Increase LLE hip and knee strength to 5/5  4. Independent with Home Exercise Programs     LTG: (to be met in 20 treatments)  1. Improve score on assessment tool LEFS from 10/80 to 60/80 or better  2. Reduce pain levels to 0/10  3. Pt able to walk normal and perform stairs normally without valgus collapse to allow him to return to normal daily function without restriction      Pt. Education:  [x] Yes  [] No  [x] Reviewed Prior HEP/Ed  Method of Education: [x] Verbal  [x] Demo  [] Written  Comprehension of Education:  [x] Verbalizes understanding. [x] Demonstrates understanding. [x] Needs review. [] Demonstrates/verbalizes HEP/Ed previously given. Plan: [x] Continue current frequency toward long and short term goals. [x] Specific Instructions for subsequent treatments: Continue focus on ROM.        Time In: 1115           Time Out: 1200    Electronically signed by:  Ulices Rodriguez PTA

## 2020-11-19 ENCOUNTER — HOSPITAL ENCOUNTER (OUTPATIENT)
Dept: PHYSICAL THERAPY | Facility: CLINIC | Age: 35
Setting detail: THERAPIES SERIES
Discharge: HOME | End: 2020-11-19
Payer: MEDICARE

## 2020-11-19 PROCEDURE — 97110 THERAPEUTIC EXERCISES: CPT

## 2020-11-19 NOTE — FLOWSHEET NOTE
[x] THE [facility] &  Therapy  [facility]   [place]: [phone]  F: [phone]      Physical Therapy Daily Treatment Note    Date:  2020  Patient Name:  Karla Gonzales    :  1985  MRN: [id number]  Physician: MAGNOLIA Tejeda                      Insurance: [place] Advantage 30 v  Medical Diagnosis: LLE ACL and meniscus repair             Rehab Codes: M25.562  Onset date: 20                   Next Dr's appt. : 20  Visit# / total visits:    Cancels/No Shows: 0    Subjective:    Pain:  [x] Yes  [] No Location: LLE Knee   Pain Rating: (0-10 scale) 3/10  Pain altered Tx:  [x] No  [] Yes  Action:  Comments: Patient arrived noting continued pain and soreness. Patient arrived with crutch but brace unlocked. Objective:  Precautions:  WBAT with brace locked in extension until quad is stronger. Goal to DC brace and crutches by 20  Exercises:  Exercise    LLE ACL/meniscus  DOS 20 Reps/ Time Weight/ Level Comments    Bike 10'   x          SB S 3x30\"   x   HS S 3x30\"   x                 Balance board 5' L2  x   4-way hip  x15 Blue   x   TKE 20x5\" Purple  x   TGym squat/HR 2x10 L20  x   Step ups 2x10 6\"  x   Step down 2x10 4\"  x   Lunges 2x10   x          Strap calf stretch  5x30\"        Strap HS stretch  5x30\"        SLR  x30  AROM     Heel slides  x20   With stretch strap supine     LAQ stretch 10x10\"      SLS  3x30\"             Prone Hang 5' 5#            Gait train hurdles 4L   x                            Other:    Treatment Charges: Mins Units   []  Modalities     [x]  Ther Exercise 40 3   []  Manual Therapy     []  Ther Activities     []  Aquatics     []  Vasocompression     []  Other Gait     Total Treatment time 40 3       Assessment: [x] Progressing toward goals. [] No change. [x] Other: Continued progressions per log, focused on weightbearing and gait training this visit.  Again stressed that patient needs to discontinue the use of the crutch and ambulate with brace unlocked. STG/LTG:  STG: (to be met in 10 treatments)  1. ? Pain: Decrease pain levels to 3/10   2. ? ROM: Increase flexibility and AROM limitations throughout to equal bilat to reduce difficulty with ADLs  3. ? Strength: Increase LLE hip and knee strength to 5/5  4. Independent with Home Exercise Programs     LTG: (to be met in 20 treatments)  1. Improve score on assessment tool LEFS from 10/80 to 60/80 or better  2. Reduce pain levels to 0/10  3. Pt able to walk normal and perform stairs normally without valgus collapse to allow him to return to normal daily function without restriction      Pt. Education:  [x] Yes  [] No  [x] Reviewed Prior HEP/Ed  Method of Education: [x] Verbal  [x] Demo  [] Written  Comprehension of Education:  [x] Verbalizes understanding. [x] Demonstrates understanding. [x] Needs review. [] Demonstrates/verbalizes HEP/Ed previously given. Plan: [x] Continue current frequency toward long and short term goals. [x] Specific Instructions for subsequent treatments: Continue focus on ROM.        Time In: 1100           Time Out: 1200    Electronically signed by:  Camron Hay PTA

## 2020-11-23 ENCOUNTER — OFFICE VISIT (OUTPATIENT)
Dept: ORTHOPEDIC SURGERY | Age: 35
End: 2020-11-23

## 2020-11-23 ENCOUNTER — HOSPITAL ENCOUNTER (OUTPATIENT)
Dept: PHYSICAL THERAPY | Facility: CLINIC | Age: 35
Setting detail: THERAPIES SERIES
Discharge: HOME | End: 2020-11-23
Payer: MEDICARE

## 2020-11-23 VITALS
DIASTOLIC BLOOD PRESSURE: 80 MMHG | HEIGHT: 71 IN | BODY MASS INDEX: 24.78 KG/M2 | WEIGHT: 177 LBS | TEMPERATURE: 97 F | HEART RATE: 95 BPM | SYSTOLIC BLOOD PRESSURE: 118 MMHG

## 2020-11-23 PROCEDURE — 97110 THERAPEUTIC EXERCISES: CPT

## 2020-11-23 PROCEDURE — 99024 POSTOP FOLLOW-UP VISIT: CPT | Performed by: ORTHOPAEDIC SURGERY

## 2020-11-23 ASSESSMENT — ENCOUNTER SYMPTOMS
ABDOMINAL PAIN: 0
COUGH: 0
APNEA: 0
CHEST TIGHTNESS: 0
VOMITING: 0
ABDOMINAL DISTENTION: 0
CONSTIPATION: 0
NAUSEA: 0
DIARRHEA: 0
COLOR CHANGE: 0
SHORTNESS OF BREATH: 0

## 2020-11-23 NOTE — FLOWSHEET NOTE
[x] THE [facility] &  Therapy  [facility]   [place]: [phone]  F: [phone]      Physical Therapy Daily Treatment Note    Date:  2020  Patient Name:  Bethany Mac    :  1985  MRN: [id number]  Physician: MAGNOLIA Cullen                      Insurance: [place] Advantage 30 v  Medical Diagnosis: LLE ACL and meniscus repair             Rehab Codes: M25.562  Onset date: 20                   Next 's appt. : 20  Visit# / total visits:    Cancels/No Shows: 0    Subjective:    Pain:  [x] Yes  [] No Location: LLE Knee   Pain Rating: (0-10 scale) 3/10  Pain altered Tx:  [x] No  [] Yes  Action:  Comments: Patient arrived following appointment with surgeon. Per patient he is to discontinue brace unless he feels he needs it. Objective:  Precautions:  WBAT    Exercises:  Exercise    LLE ACL/meniscus  DOS 20 Reps/ Time Weight/ Level Comments    Bike 10'   x          SB S 3x30\"   x   HS S 3x30\"   x                 Balance board 5' L2  x   4-way hip x15 Blue   x   TKE 20x5\" Purple  x   TGym squat/HR 2x10 L20  x   Step ups 2x10 6\"  x   Step down 2x10 4\"  x   Lunges 2x10   x          Strap calf stretch  5x30\"        Strap HS stretch  5x30\"        SLR  x30  AROM     Heel slides  x20   With stretch strap supine     LAQ stretch 10x10\"      SLS  3x30\"             Prone Hang 5' 5#            Gait train hurdles 4L   x                            Other:    Treatment Charges: Mins Units   []  Modalities     [x]  Ther Exercise 40 3   []  Manual Therapy     []  Ther Activities     []  Aquatics     []  Vasocompression     []  Other Gait     Total Treatment time 40 3       Assessment: [x] Progressing toward goals. [] No change. [x] Other: Continued strength and ROM progressions this date. Patient still very guarded with ambulation and standing exercises. Continued focus on ROM and quad strength needed.      STG/LTG:  STG: (to be met

## 2020-11-23 NOTE — PROGRESS NOTES
[facility]  [address]  [facility] [address]  Dept: [phone]  Dept Fax: [phone]        Post Operative Follow Up    Subjective:     Chief Complaint   Patient presents with    Knee Pain     Left        Post Op Surgery:     The patient is here for a follow up after having a left knee arthroscopy with medial meniscal repair and ACL reconstruction. The date of surgery was on 9/22/2020. Therefore we are 8 weeks post op. Currently the patient's pain is controlled with percocet. He is only taking half a tablet before PT. Physical therapy was started. Patient presents today with a brace that is in good repair. The patient isn't doing many exercises at home. He is still off of work. Review of Systems   Constitutional: Positive for activity change. Negative for appetite change, fatigue and fever. Respiratory: Negative for apnea, cough, chest tightness and shortness of breath. Cardiovascular: Negative for chest pain, palpitations and leg swelling. Gastrointestinal: Negative for abdominal distention, abdominal pain, constipation, diarrhea, nausea and vomiting. Genitourinary: Negative for difficulty urinating, dysuria and hematuria. Musculoskeletal: Positive for arthralgias. Negative for gait problem, joint swelling and myalgias. Skin: Negative for color change and rash. Neurological: Negative for dizziness, weakness, numbness and headaches. Psychiatric/Behavioral: Negative for sleep disturbance. I have reviewed the CC, HPI, ROS, PMH, FHX, Social History, and if not present in this note, I have reviewed in the patient's chart. I agree with the documentation provided by other staff and have reviewed their documentation prior to providing my signature indicating agreement.   Vitals:   /80   Pulse 95   Temp 97 °F (36.1 °C)   Ht 5' 11\" (1.803 m)   Wt 177 lb (80.3 kg)   BMI 24.69 kg/m²  Body mass index

## 2020-11-30 ENCOUNTER — HOSPITAL ENCOUNTER (OUTPATIENT)
Dept: PHYSICAL THERAPY | Facility: CLINIC | Age: 35
Setting detail: THERAPIES SERIES
Discharge: HOME | End: 2020-11-30
Payer: MEDICARE

## 2020-11-30 PROCEDURE — 97110 THERAPEUTIC EXERCISES: CPT

## 2020-11-30 NOTE — FLOWSHEET NOTE
[x] THE [facility] &  Therapy  [facility]   [place]: [phone]  F: [phone]      Physical Therapy Daily Treatment Note    Date:  2020  Patient Name:  Eugene Vazquezr    :  1985  MRN: [id number]  Physician: MAGNOLIA Meza                      Insurance: [place] Advantage 30 v  Medical Diagnosis: LLE ACL and meniscus repair             Rehab Codes: M25.562  Onset date: 20                   Next 's appt. : 20  Visit# / total visits:    Cancels/No Shows: 0    Subjective:    Pain:  [x] Yes  [] No Location: LLE Knee   Pain Rating: (0-10 scale) 3/10  Pain altered Tx:  [x] No  [] Yes  Action:  Comments: Patient arrived noting that he has not worn the brace since previous visit. Objective:  Precautions:  WBAT    Exercises:  Exercise    LLE ACL/meniscus  DOS 20 Reps/ Time Weight/ Level Comments    Bike 10'   x          SB S 3x30\"   x   HS S 3x30\"   x                 Balance board 5' L2  x   4-way hip x15 Blue   x   TKE 20x5\" Purple  x   TGym squat/HR 2x10 L20  x   Step ups 2x10 6\"  x   Step down 2x10 4\"  x   Lunges 2x10   x          Strap calf stretch  5x30\"        Strap HS stretch  5x30\"        SLR  x30  AROM     Heel slides  x20   With stretch strap supine     LAQ stretch 10x10\"      SLS  3x30\"             Prone Hang 5' 5#            Gait train hurdles 4L   x                            Other: [employer] to L calf/quad    Treatment Charges: Mins Units   []  Modalities     [x]  Ther Exercise 40 3   []  Manual Therapy     []  Ther Activities     []  Aquatics     []  Vasocompression     []  Other Gait     Total Treatment time 40 3       Assessment: [x] Progressing toward goals. [] No change. [x] Other: Continued focus on strength and ROM program with continued soreness noted by patient. Focus still needed on quad strength and proper gait. Reassessment performed by primary PT this date.      STG/LTG:  STG: (to be met in 10 treatments)  1. ? Pain: Decrease pain levels to 3/10   2. ? ROM: Increase flexibility and AROM limitations throughout to equal bilat to reduce difficulty with ADLs  3. ? Strength: Increase LLE hip and knee strength to 5/5  4. Independent with Home Exercise Programs     LTG: (to be met in 20 treatments)  1. Improve score on assessment tool LEFS from 10/80 to 60/80 or better  2. Reduce pain levels to 0/10  3. Pt able to walk normal and perform stairs normally without valgus collapse to allow him to return to normal daily function without restriction      Pt. Education:  [x] Yes  [] No  [x] Reviewed Prior HEP/Ed  Method of Education: [x] Verbal  [x] Demo  [] Written  Comprehension of Education:  [x] Verbalizes understanding. [x] Demonstrates understanding. [x] Needs review. [] Demonstrates/verbalizes HEP/Ed previously given. Plan: [x] Continue current frequency toward long and short term goals. [x] Specific Instructions for subsequent treatments: Continue focus on ROM.        Time In: 1100            Time Out: 1200    Electronically signed by:  Elvia Hernandez PTA

## 2020-11-30 NOTE — PROGRESS NOTES
[x] THE [facility] &  Therapy  [facility]   [place]: [phone]  F: [phone]      Physical Therapy Progress Note    Date: 2020      Patient: Isela Olmos  : 1985  MRN: [id number]    MAGNOLIA Sheth                      Insurance: [place] Advantage 30 v  Medical Diagnosis: LLE ACL and meniscus repair             Rehab Codes: M25.562  Onset date: 20                                                       Next Dr's appt. : 20  Visit# / total visits:                               Cancels/No Shows: 0    Subjective:    Pain:  [x]? Yes  []? No   Location: LLE Knee                Pain Rating: (0-10 scale) 310  Pain altered Tx:  [x]? No  []? Yes  Action:  Comments: Pt reports he has not put the brace back on his knee since Dr. Mancil Severance told him to remove it    Objective:  Test Measurements: Knee flexion AAROM 0-112 deg  Gait:  Pt ambulating without device demonstrating bent knee throughout gait cycle with decreased stance time on L   Strength: Poor eccentric quad strength on 4\" step    Assessment:  Pt is having difficulty with exercises due to pain and apprehension. Max encouragement needed to push through pain and mild fatigue during exercise. Pt has finally listened and gotten rid of crutches and brace as advised. Still lacking quad strength with severe quad atrophy.     Treatment Plan:  [x] Therapeutic Exercise   31127  [] Iontophoresis: 4 mg/mL Dexamethasone Sodium Phosphate  mAmin  53008   [] Therapeutic Activity  94161 [x] Vasopneumatic cold with compression  56056    [x] Gait Training   40188 [] Ultrasound   18433   [] Neuromuscular Re-education  46453 [] Electrical Stimulation Unattended  86273   [x] Manual Therapy  17587 [] Electrical Stimulation Attended  10348   [x] Instruction in HEP  [] Lumbar/Cervical Traction  02843   [] Aquatic Therapy   43568 [x] Cold/hotpack    [] Massage   86771      [] Dry

## 2020-12-02 ENCOUNTER — HOSPITAL ENCOUNTER (OUTPATIENT)
Dept: PHYSICAL THERAPY | Facility: CLINIC | Age: 35
Setting detail: THERAPIES SERIES
Discharge: HOME | End: 2020-12-02
Payer: MEDICARE

## 2020-12-02 PROCEDURE — 97110 THERAPEUTIC EXERCISES: CPT

## 2020-12-02 PROCEDURE — 97116 GAIT TRAINING THERAPY: CPT

## 2020-12-02 NOTE — FLOWSHEET NOTE
[x] THE [facility] &  Therapy  [facility]   [place]: [phone]  F: [phone]      Physical Therapy Daily Treatment Note    Date:  2020  Patient Name:  Jens Solorio    :  1985  MRN: [id number]  Physician: MAGNOLIA Wilson                      Insurance: [place] Advantage 30 v  Medical Diagnosis: LLE ACL and meniscus repair             Rehab Codes: M25.562  Onset date: 20                   Next 's appt. : 20  Visit# / total visits:    Cancels/No Shows: 0    Subjective:    Pain:  [x] Yes  [] No Location: LLE Knee   Pain Rating: (0-10 scale) 2.5/10  Pain altered Tx:  [x] No  [] Yes  Action:  Comments: Patient arrived stating mild pain but he is getting better each day. Objective:  Precautions:  WBAT    Exercises:  Exercise    LLE ACL/meniscus  DOS 20 Reps/ Time Weight/ Level Comments    Bike 10'   x   Treadmill 5'  Gait train  x          SB S 3x30\"   x   HS S 3x30\"   x          Balance board 5' L2  x   4-way hip x15 Blue   x   TKE 20x5\" Purple  x   TGym squat/HR 3x10 L20  x   Step ups 2x10 6\"     Step down 2x10 4\"  x   Lunges 2x10   x          SLR  x30      Prone Hang 5' 5#                       Other: Hypervolt to L calf/HS    Treatment Charges: Mins Units   []  Modalities     [x]  Ther Exercise 40 3   []  Manual Therapy     []  Ther Activities     []  Aquatics     []  Vasocompression     [x]  Other Gait 10 1   Total Treatment time 50 4       Assessment: [x] Progressing toward goals. [] No change. [x] Other: Continued focus on LLE strength, pt demonstrates weakness in LLE with each exercise. Applied manual to gastroc/hamstring with significant tightness in gastroc. Demonstrated improved gait pattern post instruction on treadmill, will continue to monitor symptoms and progress as able.          STG/LTG:  STG: (to be met in 10 treatments)  1. ? Pain: Decrease pain levels to 3/10   2. ? ROM: Increase flexibility and AROM limitations throughout to equal bilat to reduce difficulty with ADLs  3. ? Strength: Increase LLE hip and knee strength to 5/5  4. Independent with Home Exercise Programs     LTG: (to be met in 20 treatments)  1. Improve score on assessment tool LEFS from 10/80 to 60/80 or better  2. Reduce pain levels to 0/10  3. Pt able to walk normal and perform stairs normally without valgus collapse to allow him to return to normal daily function without restriction      Pt. Education:  [x] Yes  [] No  [x] Reviewed Prior HEP/Ed  Method of Education: [x] Verbal  [x] Demo  [] Written  Comprehension of Education:  [x] Verbalizes understanding. [x] Demonstrates understanding. [x] Needs review. [] Demonstrates/verbalizes HEP/Ed previously given. Plan: [x] Continue current frequency toward long and short term goals. [x] Specific Instructions for subsequent treatments: Continue focus on ROM.        Time In: 4:00pm            Time Out: pm    Electronically signed by:  Miri Jiang PTA

## 2020-12-07 ENCOUNTER — HOSPITAL ENCOUNTER (OUTPATIENT)
Dept: PHYSICAL THERAPY | Facility: CLINIC | Age: 35
Setting detail: THERAPIES SERIES
Discharge: HOME | End: 2020-12-07
Payer: MEDICARE

## 2020-12-07 PROCEDURE — 97110 THERAPEUTIC EXERCISES: CPT

## 2020-12-07 NOTE — FLOWSHEET NOTE
[x] THE [facility]  Therapy  [facility]   [place]: [phone]  F: [phone]      Physical Therapy Daily Treatment Note    Date:  2020  Patient Name:  Eugene Oar    :  1985  MRN: [id number]  Physician: MAGNOLIA Meza                      Insurance: [place] Advantage 30 v  Medical Diagnosis: LLE ACL and meniscus repair             Rehab Codes: M25.562  Onset date: 20                   Next 's appt. : 20  Visit# / total visits:    Cancels/No Shows: 0    Subjective:    Pain:  [x] Yes  [] No Location: LLE Knee   Pain Rating: (0-10 scale) 1/10  Pain altered Tx:  [x] No  [] Yes  Action:  Comments: Patient arrived noting minor soreness upon arrival but continued improvements. Objective:  Precautions:  WBAT  Exercises:  Exercise    LLE ACL/meniscus  DOS 20 Reps/ Time Weight/ Level Comments    Bike 10'   x   Treadmill 5'  Gait train  x          SB S 3x30\"   x   HS S 3x30\"   x          Balance board 5' L2  x   4-way hip x15 Blue   x   TKE 20x5\" Purple  x   TGym squat/HR 3x10 L20  x   Step ups 2x10 6\"  x   Step down 2x10 4\"  x   Heel taps 2x10 2\"  x   Lunges 2x10   x   Star slides x10   x   Bench Hovers 2x10   x          Prone Hang 5' 5#                       Other:     Treatment Charges: Mins Units   []  Modalities     [x]  Ther Exercise 45 3   []  Manual Therapy     []  Ther Activities     []  Aquatics     []  Vasocompression     []  Other Gait     Total Treatment time 45 3       Assessment: [x] Progressing toward goals. [] No change. [x] Other: Continued strength progressions this date. Patient notes fatigue with progressions with no complaint of pain. Cues needed for technique this date. Will continue to monitor response and progress as tolerated.      STG/LTG:  STG: (to be met in 10 treatments)  1. ? Pain: Decrease pain levels to 3/10   2. ? ROM: Increase flexibility and AROM limitations throughout to equal

## 2020-12-09 ENCOUNTER — HOSPITAL ENCOUNTER (OUTPATIENT)
Dept: PHYSICAL THERAPY | Facility: CLINIC | Age: 35
Setting detail: THERAPIES SERIES
Discharge: HOME | End: 2020-12-09
Payer: MEDICARE

## 2020-12-09 PROCEDURE — 97110 THERAPEUTIC EXERCISES: CPT

## 2020-12-09 NOTE — FLOWSHEET NOTE
[x] THE [facility]  Therapy  [facility]   [place]: [phone]  F: [phone]      Physical Therapy Daily Treatment Note    Date:  2020  Patient Name:  Luis Enrique Estrella    :  1985  MRN: [id number]  Physician: MAGNOLIA Melendrez                      Insurance: [place] Advantage 30 v  Medical Diagnosis: LLE ACL and meniscus repair             Rehab Codes: M25.562  Onset date: 20                   Next 's appt. : 20  Visit# / total visits:    Cancels/No Shows: 0    Subjective:    Pain:  [x] Yes  [] No Location: LLE Knee   Pain Rating: (0-10 scale) 1/10  Pain altered Tx:  [x] No  [] Yes  Action:  Comments: Patient arrived noting he is concerned about returning to work next Monday. Objective:  Precautions:  WBAT  Exercises:  Exercise    LLE ACL/meniscus  DOS 20 Reps/ Time Weight/ Level Comments    Bike 10'   x   Treadmill 5'  Gait train  x          SB S 3x30\"   x   HS S 3x30\"   x          Balance board 5' L2  x   4-way hip x15 Blue   x   TKE 20x5\" Purple  x   Rev TKE 10x10\" Purple     TGym squat/HR 3x10 L20  x   Step ups 2x10 6\"  x   Step down 2x10 4\"  x   Heel taps 2x10 2\"  x   Lunges 2x10   x   Star slides x10   x   Bench Hovers 2x10   x   Rebounder       Monster walk       SL squat to chair x10   x          Prone Hang 5' 5#                       Other:     Treatment Charges: Mins Units   []  Modalities     [x]  Ther Exercise 45 3   []  Manual Therapy     []  Ther Activities     []  Aquatics     []  Vasocompression     []  Other Gait     Total Treatment time 45 3       Assessment: [x] Progressing toward goals. [] No change. [x] Other: Continued strength and stability progressions this date. Patient notes fatigue throughout program with no complaint of pain.  Discussed importance of HEP and continued quad strengthing    STG/LTG:  STG: (to be met in 10 treatments)  1. ? Pain: Decrease pain levels to 3/10   2. ? ROM: Increase flexibility and AROM limitations throughout to equal bilat to reduce difficulty with ADLs  3. ? Strength: Increase LLE hip and knee strength to 5/5  4. Independent with Home Exercise Programs     LTG: (to be met in 20 treatments)  1. Improve score on assessment tool LEFS from 10/80 to 60/80 or better  2. Reduce pain levels to 0/10  3. Pt able to walk normal and perform stairs normally without valgus collapse to allow him to return to normal daily function without restriction      Pt. Education:  [x] Yes  [] No  [x] Reviewed Prior HEP/Ed  Method of Education: [x] Verbal  [x] Demo  [] Written  Comprehension of Education:  [x] Verbalizes understanding. [x] Demonstrates understanding. [x] Needs review. [] Demonstrates/verbalizes HEP/Ed previously given. Plan: [x] Continue current frequency toward long and short term goals. [x] Specific Instructions for subsequent treatments: Continue focus on ROM.        Time In: 1305            Time Out: 1410    Electronically signed by:  Joaquina Goodwin PTA

## 2020-12-14 ENCOUNTER — HOSPITAL ENCOUNTER (OUTPATIENT)
Dept: PHYSICAL THERAPY | Facility: CLINIC | Age: 35
Setting detail: THERAPIES SERIES
Discharge: HOME | End: 2020-12-14
Payer: MEDICARE

## 2020-12-14 PROCEDURE — 97016 VASOPNEUMATIC DEVICE THERAPY: CPT

## 2020-12-14 PROCEDURE — 97110 THERAPEUTIC EXERCISES: CPT

## 2020-12-14 PROCEDURE — 97140 MANUAL THERAPY 1/> REGIONS: CPT

## 2020-12-14 NOTE — FLOWSHEET NOTE
[x] THE [facility] &  Therapy  [facility]   [place]: [phone]  F: [phone]      Physical Therapy Daily Treatment Note    Date:  2020  Patient Name:  Roya Cast    :  1985  MRN: [id number]  Physician: MAGNOLIA Mascorro                      Insurance: [place] Advantage 30 v  Medical Diagnosis: LLE ACL and meniscus repair             Rehab Codes: M25.562  Onset date: 20                   Next 's appt. : 20  Visit# / total visits:    Cancels/No Shows: 0    Subjective:    Pain:  [x] Yes  [] No Location: LLE Knee   Pain Rating: (0-10 scale) 3/10  Pain altered Tx:  [x] No  [] Yes  Action:  Comments: Patient arrived stating it is his first day back to work and he is more sore    Objective:  Precautions:  WBAT  Exercises:  Exercise    LLE ACL/meniscus  DOS 20 Reps/ Time Weight/ Level Comments    Bike 5'   x   Treadmill 5'  Gait train  x          L only step calf stetch 3x30\"   x   HS S 3x30\"   x          Balance board 5' L2     4-way hip x15 Blue   x   TKE 20x3\" Purple  x   Rev TKE 10x10\" Purple     TGym L LE squat/ 3x10 L15  x   Step ups 2x10 6\"     Step down 2x10 4\"  x   Heel taps 2x10 2\"  x   Lunges 2x10   x   Star slides x10   x   Bench Hovers 2x10   x   BOSU squat x20  // bars x   Rebounder       Monster walk 2 laps lime Knees fwd/lateral x   SL eccentric squat 18\"step x15  + black foam x          Prone Hang 5' 5#                       Other:   Manual: Hypervolt quad, articularis genu, calf  Treatment Charges: Mins Units   []  Modalities     [x]  Ther Exercise 30 2   [x]  Manual Therapy 10 1   []  Ther Activities     []  Aquatics     [x]  Vasocompression 15 1   []  Other Gait     Total Treatment time 55 4       Assessment: [x] Progressing toward goals. [] No change. [x] Other: Pt arrived late. He is still pain limited with exercises. Many cues for exercise technique to decrease compensation.  Still lacking quad strength with poor eccentric quad control. AAROM 2-118 deg  STG/LTG:  STG: (to be met in 10 treatments)  1. ? Pain: Decrease pain levels to 3/10   2. ? ROM: Increase flexibility and AROM limitations throughout to equal bilat to reduce difficulty with ADLs  3. ? Strength: Increase LLE hip and knee strength to 5/5  4. Independent with Home Exercise Programs     LTG: (to be met in 20 treatments)  1. Improve score on assessment tool LEFS from 10/80 to 60/80 or better  2. Reduce pain levels to 0/10  3. Pt able to walk normal and perform stairs normally without valgus collapse to allow him to return to normal daily function without restriction      Pt. Education:  [x] Yes  [] No  [x] Reviewed Prior HEP/Ed  Method of Education: [x] Verbal  [x] Demo  [] Written  Comprehension of Education:  [x] Verbalizes understanding. [x] Demonstrates understanding. [x] Needs review. [] Demonstrates/verbalizes HEP/Ed previously given. Plan: [x] Continue current frequency toward long and short term goals. [x] Specific Instructions for subsequent treatments: Continue focus on ROM.        Time In: 1305            Time Out: 1410    Electronically signed by:  Kentrell Hinojosa PT

## 2020-12-16 ENCOUNTER — HOSPITAL ENCOUNTER (OUTPATIENT)
Dept: PHYSICAL THERAPY | Facility: CLINIC | Age: 35
Setting detail: THERAPIES SERIES
Discharge: HOME | End: 2020-12-16
Payer: MEDICARE

## 2020-12-16 PROCEDURE — 97110 THERAPEUTIC EXERCISES: CPT

## 2020-12-16 PROCEDURE — 97140 MANUAL THERAPY 1/> REGIONS: CPT

## 2020-12-16 NOTE — FLOWSHEET NOTE
[x] THE [facility] &  Therapy  [facility]   [place]: [phone]  F: [phone]      Physical Therapy Daily Treatment Note    Date:  2020  Patient Name:  Carmelina Fleming    :  1985  MRN: [id number]  Physician: MAGNOLIA Claire                      Insurance: [place] Advantage 30 v  Medical Diagnosis: LLE ACL and meniscus repair             Rehab Codes: M25.562  Onset date: 20                   Next 's appt. : 20  Visit# / total visits:    Cancels/No Shows: 0    Subjective:    Pain:  [x] Yes  [] No Location: LLE Knee   Pain Rating: (0-10 scale) 3/10  Pain altered Tx:  [x] No  [] Yes  Action:  Comments: Patient arrived stating it is his first day back to work and he is more sore    Objective:  Precautions:  WBAT  Exercises:  Exercise    LLE ACL/meniscus  DOS 20 Reps/ Time Weight/ Level Comments    Bike 5'   x   Treadmill 5'  Gait train  x          L only step calf stetch 3x30\"   x   HS S 3x30\"   x          Balance board 5' L2     4-way hip x20 Blue   x   TKE 20x3\" Purple     Rev TKE 10x10\" Purple     TGym L LE squat/ 3x10 L15  x   Step ups 2x10 8\"  x   Step down 2x10 4\"     Heel taps 2x10 4\"  x   Lunges-retro 2x10   x   Star slides 2x10   x   Bench Hovers 2x10   x   BOSU squat x20  // bars x   Rebounder       Monster walk 2 laps orange ankles fwd/lateral x   SL eccentric squat 18\"step x20   x   Cups 3juumm4   x   Prone Hang 5' 5#                       Other:   Manual: Hypervolt quad, articularis genu, calf  Treatment Charges: Mins Units   []  Modalities     [x]  Ther Exercise 45 3   [x]  Manual Therapy 10 1   []  Ther Activities     []  Aquatics     []  Vasocompression     []  Other Gait     Total Treatment time 55 4       Assessment: [x] Progressing toward goals. [] No change. [x] Other: Pt demonstrates better effort this date. Has worked on calf at home with spasm being less.  In prone, his shin touches the table before stretching or soft tissue. Pt is tolerating increased reps. AAROM 0-118 deg    STG/LTG:  STG: (to be met in 10 treatments)  1. ? Pain: Decrease pain levels to 3/10   2. ? ROM: Increase flexibility and AROM limitations throughout to equal bilat to reduce difficulty with ADLs  3. ? Strength: Increase LLE hip and knee strength to 5/5  4. Independent with Home Exercise Programs     LTG: (to be met in 20 treatments)  1. Improve score on assessment tool LEFS from 10/80 to 60/80 or better  2. Reduce pain levels to 0/10  3. Pt able to walk normal and perform stairs normally without valgus collapse to allow him to return to normal daily function without restriction      Pt. Education:  [x] Yes  [] No  [x] Reviewed Prior HEP/Ed  Method of Education: [x] Verbal  [x] Demo  [] Written  Comprehension of Education:  [x] Verbalizes understanding. [x] Demonstrates understanding. [x] Needs review. [] Demonstrates/verbalizes HEP/Ed previously given. Plan: [x] Continue current frequency toward long and short term goals. [x] Specific Instructions for subsequent treatments: Continue focus on ROM.        Time In: 1300            Time Out: 1410    Electronically signed by:  Pito Medel PT

## 2020-12-20 NOTE — PROGRESS NOTES
[facility]  [address]  [facility] [address]  Dept: [phone]  Dept Fax: [phone]        Post Operative Follow Up    Subjective:     Chief Complaint   Patient presents with    Knee Pain     Left knee scope DOS- 9/22/20     Post Op Surgery:     The patient is here for a follow up after having a left knee arthroscopy with medial meniscal repair and ACL reconstruction. The date of surgery was on 9/22/2020. Therefore we are 12 weeks post op. Currently the patient's pain is controlled with Tylenol. Physical therapy was started. Patient presents today with a brace that is in good repair. The patient isn't doing many exercises at home. He is back at work since the 14th of December because he was only allowed 13 weeks of short-term disability. Review of Systems   Constitutional: Positive for activity change. Negative for appetite change, fatigue and fever. Respiratory: Negative. Negative for apnea, cough, chest tightness and shortness of breath. Cardiovascular: Negative. Negative for chest pain, palpitations and leg swelling. Gastrointestinal: Negative for abdominal distention, abdominal pain, constipation, diarrhea, nausea and vomiting. Genitourinary: Negative for difficulty urinating, dysuria and hematuria. Musculoskeletal: Positive for arthralgias and gait problem. Negative for joint swelling and myalgias. Skin: Negative for color change and rash. Neurological: Negative for dizziness, weakness, numbness and headaches. Psychiatric/Behavioral: Positive for sleep disturbance. I have reviewed the CC, HPI, ROS, PMH, FHX, Social History, and if not present in this note, I have reviewed in the patient's chart. I agree with the documentation provided by other staff and have reviewed their documentation prior to providing my signature indicating agreement.   Vitals:   /86   Pulse 86   Temp 96.3 °F (35.7 °C)   Ht 5' 11\" (1.803 m)   Wt 177 lb (80.3 kg)   BMI 24.69 kg/m²  Body mass index is 24.69 kg/m². Physical Examination:     Orthopedics:    GENERAL: Alert and oriented X3 in no acute distress. SKIN: Intact without lesions or ulcerations. Incision is well healed with no signs of infection. NEURO: Intact to sensory and motor testing. VASC: Capillary refill is less than 3 seconds. Post Op Exam:    LOCATION: Left knee  SITE: Distal neurocirculatory status is intact. EXAM: Sensation is intact to light touch, there is full motor function of the extremity. ROM: 5/122 degrees, Negative Lachman's with a solid end point. No pain with Yohannes. Procedures:     Procedure:  No    Radiology:   No results found. Assessment:     1. S/P ACL reconstruction    2. Acute medial meniscus tear of left knee, subsequent encounter      Plan:   Post Op Treatment : Patient had the treatment regimen reviewed today 12 days s/p left knee arthroscopy with medial meniscal repair and ACL reconstruction. I reviewed the films with the patient. We discussed some of the etiologies and natural histories of recovery after an ACL reconstruction and a medial meniscal repair. We also discussed the various treatment alternatives including anti-inflammatory medications, physical therapy, injections, further imaging studies and as a last resort surgery. During today's visit, we discussed the patient is doing much better. He needs to continue working on getting his knee is straight as he can. The patient then stated that they understand the plan and at this time, the patient has opted continuing physical therapy to regain his strength and range of motion. An Rx refill  was not given. Patient should return to the office in 6 weeks to f/u with Marj Rubi D. [place] and at that visit, patient should have gotten his knee completely straight. The patient will call the office immediately with any problems that may arise.      No orders of the defined types were placed in this encounter. No orders of the defined types were placed in this encounter. Yakov Melgar PA-C, have personally seen this patient, reviewed the chart including history, and imaging if done. I personally  performed the physical exam and obtained any needed additional history. I placed orders, performed or supervised procedures and developed the treatment plan.     Electronically signed by Berhane Mayberry PA-C, on 12/21/2020 at 12:03 PM      Electronically signed by Berhane Mayberry PA-C, on 12/21/2020 at 12:01 PM

## 2020-12-21 ENCOUNTER — OFFICE VISIT (OUTPATIENT)
Dept: ORTHOPEDIC SURGERY | Age: 35
End: 2020-12-21

## 2020-12-21 ENCOUNTER — HOSPITAL ENCOUNTER (OUTPATIENT)
Dept: PHYSICAL THERAPY | Facility: CLINIC | Age: 35
Setting detail: THERAPIES SERIES
Discharge: HOME | End: 2020-12-21
Payer: MEDICARE

## 2020-12-21 VITALS
TEMPERATURE: 96.3 F | DIASTOLIC BLOOD PRESSURE: 86 MMHG | HEART RATE: 86 BPM | SYSTOLIC BLOOD PRESSURE: 113 MMHG | BODY MASS INDEX: 24.78 KG/M2 | WEIGHT: 177 LBS | HEIGHT: 71 IN

## 2020-12-21 PROCEDURE — 97110 THERAPEUTIC EXERCISES: CPT

## 2020-12-21 PROCEDURE — 97140 MANUAL THERAPY 1/> REGIONS: CPT

## 2020-12-21 PROCEDURE — 99024 POSTOP FOLLOW-UP VISIT: CPT | Performed by: PHYSICIAN ASSISTANT

## 2020-12-21 ASSESSMENT — ENCOUNTER SYMPTOMS
VOMITING: 0
RESPIRATORY NEGATIVE: 1
NAUSEA: 0
ABDOMINAL DISTENTION: 0
SHORTNESS OF BREATH: 0
COLOR CHANGE: 0
CONSTIPATION: 0
APNEA: 0
ABDOMINAL PAIN: 0
COUGH: 0
DIARRHEA: 0
CHEST TIGHTNESS: 0

## 2020-12-23 ENCOUNTER — HOSPITAL ENCOUNTER (OUTPATIENT)
Dept: PHYSICAL THERAPY | Facility: CLINIC | Age: 35
Setting detail: THERAPIES SERIES
Discharge: HOME | End: 2020-12-23
Payer: MEDICARE

## 2020-12-23 NOTE — FLOWSHEET NOTE
[] [facility]) - [facility] &  Therapy  [address]    [phone]  F: [phone]   [] [address]   [address]  P: [phone]  F: [phone]  [] [address] &  Therapy  [address] [facility]  P: [phone]  F: [phone] [] [address]  P: [phone]  F: [phone]  [x] [address]  [address] [address] [facility] [address]   [address]   [place]: [phone]  F: [phone]   [] [address]  [place]: [phone]   F: [phone]     Physical Therapy Cancel/No Show note    Date: 2020  Patient: Fuentes Bass  : 1985  MRN: [id number]    Cancels/No Shows to date:     For today's appointment patient:    [x]  Cancelled    [] Rescheduled appointment    [] No-show     Reason given by patient:    []  Patient ill    [x]  Conflicting appointment    [] No transportation      [] Conflict with work    [] No reason given    [] Weather related    [] [id number]    [] Other:      Comments:        [x] Next appointment was confirmed    Electronically signed by: Evonne Martinez

## 2020-12-28 ENCOUNTER — HOSPITAL ENCOUNTER (OUTPATIENT)
Dept: PHYSICAL THERAPY | Facility: CLINIC | Age: 35
Setting detail: THERAPIES SERIES
Discharge: HOME | End: 2020-12-28
Payer: MEDICARE

## 2020-12-28 PROCEDURE — 97140 MANUAL THERAPY 1/> REGIONS: CPT

## 2020-12-28 PROCEDURE — 97110 THERAPEUTIC EXERCISES: CPT

## 2020-12-30 ENCOUNTER — HOSPITAL ENCOUNTER (OUTPATIENT)
Dept: PHYSICAL THERAPY | Facility: CLINIC | Age: 35
Setting detail: THERAPIES SERIES
Discharge: HOME | End: 2020-12-30
Payer: MEDICARE

## 2020-12-30 PROCEDURE — 97140 MANUAL THERAPY 1/> REGIONS: CPT

## 2020-12-30 PROCEDURE — 97110 THERAPEUTIC EXERCISES: CPT

## 2020-12-30 NOTE — FLOWSHEET NOTE
[x] THE [facility] &  Therapy  [facility]   [place]: [phone]  F: [phone]      Physical Therapy Daily Treatment Note    Date:  2020  Patient Name:  Margoth Turcios    :  1985  MRN: [id number]  Physician: MAGNOLIA Loving                      Insurance: [place] Advantage 30 [employer]  Medical Diagnosis: LLE ACL and meniscus repair             Rehab Codes: M25.562  Onset date: 20                   Next 's appt. : 21    Visit# / total visits:    Cancels/No Shows: 0    Subjective:    Pain:  [x] Yes  [] No Location: LLE Knee   Pain Rating: (0-10 scale) 3/10  Pain altered Tx:  [x] No  [] Yes  Action:  Comments: Patient arrived noting continued soreness from previous visit. Objective:  Precautions:  WBAT  Exercises:  Exercise    LLE ACL/meniscus  DOS 20 Reps/ Time Weight/ Level Comments    Bike 10'   x   Treadmill 5'  Gait train            L only step calf stetch 3x30\"   x   HS S 3x30\"   x          Balance board 3' L3  x   4-way hip x20 Blue   x   TKE 20x3\" Purple     Rev TKE 10x10\" Purple     TGym L LE sidelyingsquat/ 3x10 L15  x   Step ups 2x10 8\"  x   Step down 2x10 4\"     Heel taps 2x10 4\"  x   Lunges-retro 2x10      Star slides 2x10      Hovers x20 16\"  x   BOSU squat x30  // bars x   Rebounder       Monster walk 2 laps ea lime ankles fwd/lateral x   SL eccentric squat  x20 To the 18\"step  x   Cups 1 cup x20   x   Prone Hang 5' 5#     Self knee flexion stretch on step 10x10\"   x    Split squat isometric 10x10\"    Both feet on floor x   Split squat isotonic 2x10   x   Other:   Manual: Hypervolt quad, articularis genu, calf    Treatment Charges: Mins Units   []  Modalities     [x]  Ther Exercise 50 3   [x]  Manual Therapy 10 1   []  Ther Activities     []  Aquatics     []  Vasocompression     []  Other Gait     Total Treatment time 60 4       Assessment: [x] Progressing toward goals. [] No change.      [x] Other: Continued SL strength and stability progressions this date. Patient continues to fatigue quickly with quad based exercies. Improved AAROM noted. 12/30/20:AAROM 0-128 deg    STG/LTG:  STG: (to be met in 10 treatments)  1. ? Pain: Decrease pain levels to 3/10   2. ? ROM: Increase flexibility and AROM limitations throughout to equal bilat to reduce difficulty with ADLs  3. ? Strength: Increase LLE hip and knee strength to 5/5  4. Independent with Home Exercise Programs     LTG: (to be met in 20 treatments)  1. Improve score on assessment tool LEFS from 10/80 to 60/80 or better  2. Reduce pain levels to 0/10  3. Pt able to walk normal and perform stairs normally without valgus collapse to allow him to return to normal daily function without restriction      Pt. Education:  [x] Yes  [] No  [x] Reviewed Prior HEP/Ed  Method of Education: [x] Verbal  [x] Demo  [] Written  Comprehension of Education:  [x] Verbalizes understanding. [x] Demonstrates understanding. [x] Needs review. [] Demonstrates/verbalizes HEP/Ed previously given. Plan: [x] Continue current frequency toward long and short term goals. [x] Specific Instructions for subsequent treatments: Continue focus on ROM.        Time In: 1255           Time Out: 1355    Electronically signed by:  Stephen Cuenca PTA

## 2021-01-04 ENCOUNTER — HOSPITAL ENCOUNTER (OUTPATIENT)
Dept: PHYSICAL THERAPY | Facility: CLINIC | Age: 36
Setting detail: THERAPIES SERIES
Discharge: HOME | End: 2021-01-04
Payer: MEDICARE

## 2021-01-04 PROCEDURE — 97110 THERAPEUTIC EXERCISES: CPT

## 2021-01-04 PROCEDURE — 97140 MANUAL THERAPY 1/> REGIONS: CPT

## 2021-01-04 NOTE — FLOWSHEET NOTE
compensated trendelenburg. Stressed importance of using the quad in function. Reviewed heel to toe walking gait with quad activation to get TKE in stance. Pt reported the knee feeling better at toe off and not catching. He needs to follow through with more aggressive strengthening at home. [] No change. [x] Other: Continued SL strength and stability progressions this date. Patient continues to fatigue quickly with quad based exercies. Improved AAROM noted. 12/30/20:AAROM 0-128 deg    STG/LTG:  STG: (to be met in 10 treatments)  1. ? Pain: Decrease pain levels to 3/10   2. ? ROM: Increase flexibility and AROM limitations throughout to equal bilat to reduce difficulty with ADLs  3. ? Strength: Increase LLE hip and knee strength to 5/5  4. Independent with Home Exercise Programs     LTG: (to be met in 20 treatments)  1. Improve score on assessment tool LEFS from 10/80 to 60/80 or better  2. Reduce pain levels to 0/10  3. Pt able to walk normal and perform stairs normally without valgus collapse to allow him to return to normal daily function without restriction      Pt. Education:  [x] Yes  [] No  [x] Reviewed Prior HEP/Ed  Method of Education: [x] Verbal  [x] Demo  [] Written  Comprehension of Education:  [x] Verbalizes understanding. [x] Demonstrates understanding. [x] Needs review. [] Demonstrates/verbalizes HEP/Ed previously given. Plan: [x] Continue current frequency toward long and short term goals. [x] Specific Instructions for subsequent treatments: Continue focus on ROM.        Time In: 0903          Time Out: 1003    Electronically signed by:  Madonna Morfin, PT

## 2021-01-06 ENCOUNTER — HOSPITAL ENCOUNTER (OUTPATIENT)
Dept: PHYSICAL THERAPY | Facility: CLINIC | Age: 36
Setting detail: THERAPIES SERIES
Discharge: HOME | End: 2021-01-06
Payer: MEDICARE

## 2021-01-06 PROCEDURE — 97110 THERAPEUTIC EXERCISES: CPT

## 2021-01-11 ENCOUNTER — HOSPITAL ENCOUNTER (OUTPATIENT)
Dept: PHYSICAL THERAPY | Facility: CLINIC | Age: 36
Setting detail: THERAPIES SERIES
Discharge: HOME | End: 2021-01-11
Payer: MEDICARE

## 2021-01-11 PROCEDURE — 97110 THERAPEUTIC EXERCISES: CPT

## 2021-01-11 NOTE — FLOWSHEET NOTE
number of rest breaks required. Walking with improved equality to stance time. Still struggles with 4 inch step heel taps due to decreased quad strength. [x] Other: Continued SL strength and stability progressions this date. Patient continues to fatigue quickly with quad based exercies. Improved AAROM noted. 12/30/20:AAROM 0-128 deg    STG/LTG:  STG: (to be met in 10 treatments)  1. ? Pain: Decrease pain levels to 3/10   2. ? ROM: Increase flexibility and AROM limitations throughout to equal bilat to reduce difficulty with ADLs  3. ? Strength: Increase LLE hip and knee strength to 5/5  4. Independent with Home Exercise Programs     LTG: (to be met in 20 treatments)  1. Improve score on assessment tool LEFS from 10/80 to 60/80 or better  2. Reduce pain levels to 0/10  3. Pt able to walk normal and perform stairs normally without valgus collapse to allow him to return to normal daily function without restriction      Pt. Education:  [x] Yes  [] No  [x] Reviewed Prior HEP/Ed  Method of Education: [x] Verbal  [x] Demo  [] Written  Comprehension of Education:  [x] Verbalizes understanding. [x] Demonstrates understanding. [x] Needs review. [] Demonstrates/verbalizes HEP/Ed previously given. Plan: [x] Continue current frequency toward long and short term goals. [x] Specific Instructions for subsequent treatments: Continue focus on ROM.        Time In: 0610      Time Out: 5210    Electronically signed by:  Belle Prieto, PT

## 2021-01-13 ENCOUNTER — HOSPITAL ENCOUNTER (OUTPATIENT)
Dept: PHYSICAL THERAPY | Facility: CLINIC | Age: 36
Setting detail: THERAPIES SERIES
Discharge: HOME | End: 2021-01-13
Payer: MEDICARE

## 2021-01-13 PROCEDURE — 97110 THERAPEUTIC EXERCISES: CPT

## 2021-01-13 NOTE — FLOWSHEET NOTE
[x] THE [facility] &  Therapy  [facility]   [place]: [phone]  F: [phone]      Physical Therapy Daily Treatment Note    Date:  2021  Patient Name:  Felice Parnell    :  1985  MRN: [id number]  Physician: MAGNOLIA Long                      Insurance: [place] Advantage 30 [employer]  Medical Diagnosis: LLE ACL and meniscus repair             Rehab Codes: M25.562  Onset date: 20                   Next 's appt. : 21  Visit# / total visits:      Cancels/No Shows: 0    Subjective:    Pain:  [x] Yes  [] No Location: LLE Knee   Pain Rating: (0-10 scale) 410  Pain altered Tx:  [x] No  [] Yes  Action:  Comments:Pt arrives noting increased pain from sitting in the car all day. Arrives 8 min late. Objective:   Precautions:  WBAT  Exercises: bolded completed 21  Exercise    LLE ACL/meniscus  DOS 20 Reps/ Time Weight/ Level Comments    Bike 5'   x   Treadmill 5'  Gait train            L only step calf stetch 3x30\"   x   HS S 3x30\"   x          Balance board 3' L3     4-way hip x20 purple  x   TKE 20x3\" Purple     Rev TKE 10x10\" Purple     TGym L LE supine squat/ HR 2x10 L20  x   Step ups- fwd/lat 2x10 8\"  x   Step down 2x10 4\"     Heel taps 3x10 4\" 3 direction x   Lunges x15  Front/45/lat x   Star slides 2x10      Hovers x30 Low stool  x   BOSU squat x30  // bars x   Rebounder       Monster walk 2 laps ea blue ankles fwd/lateral x   SL eccentric squat  x20 To the 18\"step  x   Cups 1 cup x20      Prone Hang 5' 5#     Self knee flexion stretch on step 10x10\"   x   Split squat isometric 3x30\"    Both feet on floor x   Split squat isotonic 2x10   x   Other:   Manual: prn    Treatment Charges: Mins Units   []  Modalities     [x]  Ther Exercise 50 3   []  Manual Therapy     []  Ther Activities     []  Aquatics     []  Vasocompression     []  Other Gait     Total Treatment time 50 3       Assessment: [x] Progressing toward goals.  Pt with moderate tolerance to treatment. Completes exercises slow and deliberately. Cueing required to improve SL eccentric control of the quad. Pt with moderate carry over. Continues to have significant fatigue through out session therefore rest breaks were implemented. Cueing required to decrease valgus collapse with all SL based exercises. [x] Other:     12/30/20:AAROM 0-128 deg    STG/LTG:  STG: (to be met in 10 treatments)  1. ? Pain: Decrease pain levels to 3/10   2. ? ROM: Increase flexibility and AROM limitations throughout to equal bilat to reduce difficulty with ADLs  3. ? Strength: Increase LLE hip and knee strength to 5/5  4. Independent with Home Exercise Programs     LTG: (to be met in 20 treatments)  1. Improve score on assessment tool LEFS from 10/80 to 60/80 or better  2. Reduce pain levels to 0/10  3. Pt able to walk normal and perform stairs normally without valgus collapse to allow him to return to normal daily function without restriction      Pt. Education:  [x] Yes  [] No  [x] Reviewed Prior HEP/Ed  Method of Education: [x] Verbal  [x] Demo  [] Written  Comprehension of Education:  [x] Verbalizes understanding. [x] Demonstrates understanding. [x] Needs review. [] Demonstrates/verbalizes HEP/Ed previously given. Plan: [x] Continue current frequency toward long and short term goals. [x] Specific Instructions for subsequent treatments: Continue focus on ROM.        Time In: 4:08      Time Out: 5:11pm    Electronically signed by:  Doneen Cogan, PTA

## 2021-01-18 ENCOUNTER — HOSPITAL ENCOUNTER (OUTPATIENT)
Dept: PHYSICAL THERAPY | Facility: CLINIC | Age: 36
Setting detail: THERAPIES SERIES
Discharge: HOME | End: 2021-01-18
Payer: MEDICARE

## 2021-01-18 PROCEDURE — 97110 THERAPEUTIC EXERCISES: CPT

## 2021-01-18 NOTE — FLOWSHEET NOTE
[x] THE [facility] &  Therapy  [facility]   [place]: [phone]  F: [phone]      Physical Therapy Daily Treatment Note    Date:  2021  Patient Name:  Darryl Singer    :  1985  MRN: [id number]  Physician: MAGNOLIA Hoffmann                      Insurance: [place] Advantage 30 v  Medical Diagnosis: LLE ACL and meniscus repair             Rehab Codes: M25.562  Onset date: 20                   Next 's appt. : 21  Visit# / total visits:      Cancels/No Shows: 0    Subjective:    Pain:  [x] Yes  [] No Location: LLE Knee   Pain Rating: (0-10 scale) 4/10  Pain altered Tx:  [x] No  [] Yes  Action:  Comments:Pt arrives 10 min late    Objective:   Precautions:  WBAT  Exercises:   Exercise    LLE ACL/meniscus  DOS 20 Reps/ Time Weight/ Level Comments    Bike 5'   x   Treadmill 5'  Gait train            L only step calf stetch 3x30\"   x   HS S 3x30\"   x          Balance board 3' L3     4-way hip x20 purple     TKE 20x3\" Purple     Rev TKE 10x10\" Purple     TGym L LE supine squat/ HR 2x10 L20  x   Step ups- fwd/lat 2x10 8\"  x   Step down 2x10 4\"     Heel taps 2x10 4\" 3 direction x   Lunges x10  Front/45/lat x   Star slides x20   x   Hovers x30 12\"step  x   BOSU squat x30  // bars    Rebounder       Monster walk 2 laps ea blue ankles fwd/lateral x   SL eccentric squat  x20 To the 12\"step + foam  x   DL deadlift x20 5#  x   Prone Hang 5' 5#     Self knee flexion stretch on step 10x10\"   x   Split squat isometric 3x30\"    Both feet on floor    Split squat isotonic 2x10   x   Other:   Manual: prn    Treatment Charges: Mins Units   []  Modalities     [x]  Ther Exercise 50 3   []  Manual Therapy     []  Ther Activities     []  Aquatics     []  Vasocompression     []  Other Gait     Total Treatment time 50 3       Assessment: [x] Progressing toward goals.  Pt is weak in quad that leads to some mild anterior knee pain with lower stool eccentric quad. Pain resolves post exercise. Pt is not performing HEP. Stressed again that      [x] Other:     12/30/20:AAROM 0-128 deg    STG/LTG:  STG: (to be met in 10 treatments)  1. ? Pain: Decrease pain levels to 3/10   2. ? ROM: Increase flexibility and AROM limitations throughout to equal bilat to reduce difficulty with ADLs  3. ? Strength: Increase LLE hip and knee strength to 5/5  4. Independent with Home Exercise Programs     LTG: (to be met in 20 treatments)  1. Improve score on assessment tool LEFS from 10/80 to 60/80 or better  2. Reduce pain levels to 0/10  3. Pt able to walk normal and perform stairs normally without valgus collapse to allow him to return to normal daily function without restriction      Pt. Education:  [x] Yes  [] No  [x] Reviewed Prior HEP/Ed  Method of Education: [x] Verbal  [x] Demo  [] Written  Comprehension of Education:  [x] Verbalizes understanding. [x] Demonstrates understanding. [x] Needs review. [] Demonstrates/verbalizes HEP/Ed previously given. Plan: [x] Continue current frequency toward long and short term goals. [x] Specific Instructions for subsequent treatments: Continue focus on ROM.        Time In: 0910     Time Out: 1000    Electronically signed by:  Lul Marte, PT

## 2021-01-20 ENCOUNTER — HOSPITAL ENCOUNTER (OUTPATIENT)
Dept: PHYSICAL THERAPY | Facility: CLINIC | Age: 36
Setting detail: THERAPIES SERIES
Discharge: HOME | End: 2021-01-20
Payer: MEDICARE

## 2021-01-20 PROCEDURE — 97110 THERAPEUTIC EXERCISES: CPT

## 2021-01-20 NOTE — FLOWSHEET NOTE
the time that pt was here this date. Still needs to do HEP. [x] Other:     12/30/20:AAROM 0-128 deg    STG/LTG:  STG: (to be met in 10 treatments)  1. ? Pain: Decrease pain levels to 3/10   2. ? ROM: Increase flexibility and AROM limitations throughout to equal bilat to reduce difficulty with ADLs  3. ? Strength: Increase LLE hip and knee strength to 5/5  4. Independent with Home Exercise Programs     LTG: (to be met in 20 treatments)  1. Improve score on assessment tool LEFS from 10/80 to 60/80 or better  2. Reduce pain levels to 0/10  3. Pt able to walk normal and perform stairs normally without valgus collapse to allow him to return to normal daily function without restriction      Pt. Education:  [x] Yes  [] No  [x] Reviewed Prior HEP/Ed  Method of Education: [x] Verbal  [x] Demo  [] Written  Comprehension of Education:  [x] Verbalizes understanding. [x] Demonstrates understanding. [x] Needs review. [] Demonstrates/verbalizes HEP/Ed previously given. Plan: [x] Continue current frequency toward long and short term goals.      [x] Specific Instructions for subsequent treatments: Raised [place] split squat next visit      Time In: 3:12 pm   Time Out: 4:02pm    Electronically signed by:  Marleen French PT

## 2021-01-27 ENCOUNTER — HOSPITAL ENCOUNTER (OUTPATIENT)
Dept: PHYSICAL THERAPY | Facility: CLINIC | Age: 36
Setting detail: THERAPIES SERIES
Discharge: HOME | End: 2021-01-27
Payer: MEDICARE

## 2021-01-27 PROCEDURE — 97112 NEUROMUSCULAR REEDUCATION: CPT

## 2021-01-27 PROCEDURE — 97110 THERAPEUTIC EXERCISES: CPT

## 2021-01-27 NOTE — FLOWSHEET NOTE
[x] THE [facility] &  Therapy  [facility]   [place]: [phone]  F: [phone]      Physical Therapy Daily Treatment Note    Date:  2021  Patient Name:  Felice Parnell    :  1985  MRN: [id number]  Physician: MAGNOLIA Long                      Insurance: [place] Advantage 30 [employer]  Medical Diagnosis: LLE ACL and meniscus repair             Rehab Codes: M25.562  Onset date: 20                   Next 's appt. : 21  Visit# / total visits:      Cancels/No Shows: 0    Subjective:    Pain:  [x] Yes  [] No Location: LLE Knee   Pain Rating: (0-10 scale) 2/10  Pain altered Tx:  [x] No  [] Yes  Action:  Comments:Pt arrives 5 min late. Notes its been a long day. States he is achy and sore due to working a lot of hours.      Objective:   Precautions:  WBAT  Exercises: Bolded completed 21      Reps/ Time Weight/ Level Comments    Bike 10'   x   Treadmill 5'  Gait train            L only step calf stetch 2'   x   HS S 3x30\"   x   gastroc stretch end of session 3x30\"             Balance board 3' L3     4-way hip x20 purple  x   TKE 20x3\" Purple     Rev TKE 10x10\" Purple     Heel raises 20x      TGym L LE supine squat/ HR 2x10 L22  x   Step ups- fwd/lat x15 12\"/8\"  x   Step down 2x10 4\"     Heel taps 2x10 4\" 3 direction x   Lunges x10  Front/45/lat    Star slides x20   x   Hovers x30 12\"step  x   BOSU squat with med ball toss x30 Blue med ball // bars x   SLS on foam rebounder toss 30x Yellow ball, black foam     Step up with hip hike, eccentric lowering 3x10 12\"     Monster walk 2 laps ea [place] ankles fwd/lateral x   SL eccentric squat  x20 To the 12\"step + foam  x   DL deadlift x20 No weight Max cueing to perform correctly x   Prone Hang 5' 5#     Self knee flexion stretch on step 10x10\"   x   [place] split squat 3x10  1st set with UE assist    Split squat isometric 3x30\"    Both feet on floor    Split squat isotonic 3x10   x   Other: Manual: prn    Treatment Charges: Mins Units   []  Modalities     [x]  Ther Exercise 40 3   []  Manual Therapy     []  Ther Activities     []  Aquatics     []  Vasocompression     [x]  Other Neuro 15 1   Total Treatment time 55 3       Assessment: [x] Progressing toward goals. Pt with overall good tolerance to treatment however still fatigues quickly. Pt with max difficulty completing dead lifts with proper execution despite max cueing of all forms. Utilized mirror for visual cueing, demonstration from clinician, tactile cueing to promote hip hinge and glue activation and use of a dowel erik to improve hip hinge all with poor carry over. Added in [place] split squats to improve quad and glute strength. Pt with excessive trunk lean noted and required cueing to improve. Pt also demonstrates valgus collapse during all single leg activities that requires cueing to improve however only has ~ 50% carryover. Added in step ups with hip drive and eccentric lowering however unable to control lowering due to quad weakness and fatigue. Added in med ball tossing during squats on bosu to assist with quad loading. Pt frequently utilizes trunk and contralateral limb to prevent LOB. Pt required cueing through out treatment to complete exercises with slow and controlled pace to improve isolated muscle recruitment and decrease overall compensation. Again reminded patient the importance of HEP compliance for carry over between treatment sessions. [x] Other:       12/30/20:AAROM 0-128 deg    STG/LTG:  STG: (to be met in 10 treatments)  1. ? Pain: Decrease pain levels to 3/10   2. ? ROM: Increase flexibility and AROM limitations throughout to equal bilat to reduce difficulty with ADLs  3. ? Strength: Increase LLE hip and knee strength to 5/5  4. Independent with Home Exercise Programs     LTG: (to be met in 20 treatments)  1.  Improve score on assessment tool LEFS from 10/80 to 60/80 or better  2. Reduce pain levels to 0/10  3. Pt able to walk normal and perform stairs normally without valgus collapse to allow him to return to normal daily function without restriction      Pt. Education:  [x] Yes  [] No  [x] Reviewed Prior HEP/Ed  Method of Education: [x] Verbal  [x] Demo  [] Written  Comprehension of Education:  [x] Verbalizes understanding. [x] Demonstrates understanding. [x] Needs review. [] Demonstrates/verbalizes HEP/Ed previously given. Plan: [x] Continue current frequency toward long and short term goals.      [x] Specific Instructions for subsequent treatments:       Time In: 2:05 pm   Time Out: 310pm    Electronically signed by:  Milly Abreu PTA

## 2021-02-01 ENCOUNTER — OFFICE VISIT (OUTPATIENT)
Dept: ORTHOPEDIC SURGERY | Age: 36
End: 2021-02-01
Payer: MEDICARE

## 2021-02-01 ENCOUNTER — HOSPITAL ENCOUNTER (OUTPATIENT)
Dept: PHYSICAL THERAPY | Facility: CLINIC | Age: 36
Setting detail: THERAPIES SERIES
Discharge: HOME | End: 2021-02-01
Payer: MEDICARE

## 2021-02-01 VITALS
DIASTOLIC BLOOD PRESSURE: 82 MMHG | TEMPERATURE: 97.3 F | BODY MASS INDEX: 24.78 KG/M2 | HEIGHT: 71 IN | SYSTOLIC BLOOD PRESSURE: 128 MMHG | WEIGHT: 177 LBS | HEART RATE: 82 BPM

## 2021-02-01 DIAGNOSIS — Z98.890 S/P RIGHT KNEE ARTHROSCOPY: ICD-10-CM

## 2021-02-01 DIAGNOSIS — S83.242D ACUTE MEDIAL MENISCUS TEAR OF LEFT KNEE, SUBSEQUENT ENCOUNTER: ICD-10-CM

## 2021-02-01 DIAGNOSIS — Z98.890 S/P ACL RECONSTRUCTION: Primary | ICD-10-CM

## 2021-02-01 PROCEDURE — G8420 CALC BMI NORM PARAMETERS: HCPCS | Performed by: ORTHOPAEDIC SURGERY

## 2021-02-01 PROCEDURE — 4004F PT TOBACCO SCREEN RCVD TLK: CPT | Performed by: ORTHOPAEDIC SURGERY

## 2021-02-01 PROCEDURE — G8484 FLU IMMUNIZE NO ADMIN: HCPCS | Performed by: ORTHOPAEDIC SURGERY

## 2021-02-01 PROCEDURE — G8427 DOCREV CUR MEDS BY ELIG CLIN: HCPCS | Performed by: ORTHOPAEDIC SURGERY

## 2021-02-01 PROCEDURE — 97110 THERAPEUTIC EXERCISES: CPT

## 2021-02-01 PROCEDURE — 99213 OFFICE O/P EST LOW 20 MIN: CPT | Performed by: ORTHOPAEDIC SURGERY

## 2021-02-01 ASSESSMENT — ENCOUNTER SYMPTOMS
CONSTIPATION: 0
ABDOMINAL DISTENTION: 0
APNEA: 0
CHEST TIGHTNESS: 0
NAUSEA: 0
COLOR CHANGE: 0
COUGH: 0
DIARRHEA: 0
VOMITING: 0
SHORTNESS OF BREATH: 0
ABDOMINAL PAIN: 0

## 2021-02-01 NOTE — FLOWSHEET NOTE
[x] THE [facility] &  Therapy  [facility]   [place]: [phone]  F: [phone]      Physical Therapy Daily Treatment Note    Date:  2021  Patient Name:  Salazar Babcock    :  1985  MRN: [id number]  Physician: MAGNOLIA Logan                      Insurance: [place] Advantage 30 [employer]  Medical Diagnosis: LLE ACL and meniscus repair             Rehab Codes: M25.562  Onset date: 20                   Next Dr's appt. : 21  Visit# / total visits:      Cancels/No Shows: 0    Subjective:    Pain:  [x] Yes  [] No Location: LLE Knee   Pain Rating: (0-10 scale) 2/10  Pain altered Tx:  [x] No  [] Yes  Action:  Comments: Patient arrived 10 min late for appointment this date. Notes soreness with no significant complaint of pain.      Objective:   Precautions:  WBAT  Exercises: Bolded completed 21      Reps/ Time Weight/ Level Comments    Bike 10'   x   Treadmill 5'  Gait train            L only step calf stetch 2'   x   HS S 3x30\"   x   gastroc stretch end of session 3x30\"             Balance board 3' L3     4-way hip x20 purple  x   TKE 20x3\" Purple     Rev TKE 10x10\" Purple     Heel raises 20x      TGym L LE supine squat/ HR 2x10 L22  x   Step ups- fwd/lat x15 12\"/8\"  x   Step down 2x10 4\"     Heel taps 2x10 4\" 3 direction x   Lunges x10  Front/45/lat    Star slides x20   x   Hovers x30 12\"step  x   BOSU squat with med ball toss x30 Blue med ball // bars x   SLS on foam rebounder toss 30x Yellow ball, black foam     Step up with hip hike, eccentric lowering 3x10 12\"     Monster walk 2 laps ea [place] ankles fwd/lateral x   SL eccentric squat  x20 To the 12\"step + foam  x   DL deadlift x20 No weight Max cueing to perform correctly x   Prone Hang 5' 5#     Self knee flexion stretch on step 10x10\"   x   [place] split squat 3x10  1st set with UE assist    Split squat isometric 3x30\"    Both feet on floor    Split squat isotonic 3x10   x   Other: Manual: prn    Treatment Charges: Mins Units   []  Modalities     [x]  Ther Exercise 40 3   []  Manual Therapy     []  Ther Activities     []  Aquatics     []  Vasocompression     []  Other Neuro     Total Treatment time 40 3       Assessment: [x] Progressing toward goals. [x] Other: Continued strength progressions this date. Patient continues to fatigue quickly with minor pain associated. Continued focus needed on quad strength and control. STG/LTG:  STG: (to be met in 10 treatments)  1. ? Pain: Decrease pain levels to 3/10   2. ? ROM: Increase flexibility and AROM limitations throughout to equal bilat to reduce difficulty with ADLs  3. ? Strength: Increase LLE hip and knee strength to 5/5  4. Independent with Home Exercise Programs     LTG: (to be met in 20 treatments)  1. Improve score on assessment tool LEFS from 10/80 to 60/80 or better  2. Reduce pain levels to 0/10  3. Pt able to walk normal and perform stairs normally without valgus collapse to allow him to return to normal daily function without restriction      Pt. Education:  [x] Yes  [] No  [x] Reviewed Prior HEP/Ed  Method of Education: [x] Verbal  [] Demo  [] Written  Comprehension of Education:  [x] Verbalizes understanding. [x] Demonstrates understanding. [x] Needs review. [] Demonstrates/verbalizes HEP/Ed previously given. Plan: [x] Continue current frequency toward long and short term goals. [x] Specific Instructions for subsequent treatments: continue strength and ROM progressions.        Time In: 0810      Time Out:  0910     Electronically signed by:  Tara Nichole PTA

## 2021-02-01 NOTE — PROGRESS NOTES
[facility]  [address]  [facility] [address]  Dept: [phone]  Dept Fax: [phone]        Post Operative Follow Up    Subjective:     Chief Complaint   Patient presents with    Knee Pain     Left knee scope DOS- 9/22/20     Post Op Surgery:     The patient is here for a follow up after having a left knee arthroscopy with medial meniscal repair and ACL reconstruction. The date of surgery was on 09/22/2020. Therefore we are 19 weeks post op. Currently the patient's pain is controlled with tylenol. Physical therapy was started and he states that he still attends twice a week. Patient presents today with no ambulatory devices. Patient states they are doing well and he has been back to work since December and he states that he works 12 hours shifts now and he works 6 days a week. He states that his knee tends to be sore after work but it is not excruciating pain. Review of Systems   Constitutional: Positive for activity change. Negative for appetite change. Respiratory: Negative for apnea, cough, chest tightness and shortness of breath. Cardiovascular: Negative for chest pain, palpitations and leg swelling. Gastrointestinal: Negative for abdominal distention, abdominal pain, constipation, diarrhea, nausea and vomiting. Genitourinary: Negative for difficulty urinating, dysuria and hematuria. Musculoskeletal: Positive for arthralgias. Negative for gait problem, joint swelling and myalgias. Skin: Negative for color change and rash. Neurological: Negative for dizziness, weakness, numbness and headaches. Psychiatric/Behavioral: Negative for sleep disturbance. I have reviewed the CC, HPI, ROS, PMH, FHX, Social History, and if not present in this note, I have reviewed in the patient's chart.  I agree with the documentation provided by other staff and have reviewed their documentation prior to providing my signature indicating agreement. Vitals:   /82   Pulse 82   Temp 97.3 °F (36.3 °C)   Ht 5' 11\" (1.803 m)   Wt 177 lb (80.3 kg)   BMI 24.69 kg/m²  Body mass index is 24.69 kg/m². Physical Examination:     Orthopedics:    GENERAL: Alert and oriented X3 in no acute distress. SKIN: Intact without lesions or ulcerations. Incisions are well healed with no signs of infection. NEURO: Intact to sensory and motor testing. VASC: Capillary refill is less than 3 seconds. Post Op Exam:    LOCATION: Left knee  SITE: Distal neurocirculatory status is intact. EXAM: Sensation is intact to light touch, there is full motor function of the extremity. GAIT: The patient's gait was observed while entering the exam room and was noted to be non antalgic. The extremity is in anatomic alignment. SKIN: Intact without rashes, lesions, or ulcerations. No obvious deformity or swelling. NEURO: The patient responds to light touch throughout left LE. Patellar and Achilles reflexes are 2/4. VASC: The left LE is neurovascularly intact with 2/4 DP and 2/4 PT pulses. Brisk capillary refill. ROM: 0/128 degrees. There is no effusion. Pain with forced extension. MUSC: slightly decreased quad tone  LIGAMENT: Lachman's test is Negative with Good endpoint. Anterior drawer Negative. Posterior drawer Negative. There is  No varus instability at 0 degrees and No varus instability at 30 degrees. There is No valgus instability at 0 degrees and No valgus instability at 30 degrees. PALP: There is no joint line pain. Procedures:     Procedure:  No    Radiology:   X-ray was reviewed from 09/30/2020. Assessment:     1. S/P ACL reconstruction    2. Acute medial meniscus tear of left knee, subsequent encounter    3. S/P right knee arthroscopy      Plan:   Post Op Treatment : Patient had the treatment regimen reviewed today 19 weeks s/p left knee arthroscopy with medial meniscal repair and ACL reconstruction. I reviewed the films with the patient. We discussed some of the etiologies and natural histories of s/p left knee arthroscopy. We also discussed the various treatment alternatives including anti-inflammatory medications, physical therapy, injections, further imaging studies and as a last resort surgery. During today's visit, I explained to the patient that I am glad he is doing a lot better but I asked him if his knee is in enough pain today for us to try a cortisone injection and the patient stated that it is not. I then asked him if he can take NSAID's at a prescription dose to help with his pain and he stated that he cannot take ibuprofen because he is allergic to it. I then asked him if he can take Meloxicam and the patient stated that he believes he can. I then informed him that I will give him a prescription for Meloxicam where he can take it once a day but he should also couple it with tylenol to help him get good pain relief. However, I explained to the patient that he should work hard on regaining his quadriceps strength so his knee may have more stability and to reduce his risks of re-tear. Then if he is still having pain, he should inform Ortiz Fernández of that pain at his next visit and we can inject his knee if needed. The patient then stated that they understand the plan and at this time, the patient has opted for a prescription for Meloxicam and he will continue to work hard on rebuilding his quadriceps strength so he may have more stability within his knee. An Rx refill of meloxicam was given. Patient should return to the office in 4-6 weeks to f/u with Vanessa Lemus PA-C and at that visit, the patient should inform Ortiz Fernández about his knee pain if he has any and she should inject his knee with cortisone. Patient's quadriceps strength should also be a lot more improved as well. The patient will call the office immediately with any problems that may arise.      Orders Placed This Encounter   Medications    meloxicam (MOBIC) 15 MG tablet     Sig: Take 1 tablet by mouth daily as needed for Pain     Dispense:  30 tablet     Refill:  0     No orders of the defined types were placed in this encounter. I, Lenoard Apgar, am scribing for and in the presence of Juliana Hernandez D.O.. 2021  2:23 PM      I, Juliana Hernandez DO, have personally seen this patient and I have reviewed the CC, PMH, FHX and Social History as provided by other clinical staff. I reassessed the HPI and ROS as scribed by Lenoard Apgar, Medical Scribe in my presence and it is both accurate and complete. Thereafter, I personally performed the PE, reviewed the imaging and established the DX and POC. I agree with the documentation provided by the Medical Scribe. I have reviewed all documentation in its entirety prior to providing my signature indicating agreement. Any areas of disagreement are noted on the chart.     Electronically signed by Lois Yusuf DO on 2021 at 2:23 PM        Electronically signed by Lois Yusuf DO, on 2021 at 2:23 PM

## 2021-02-02 RX ORDER — MELOXICAM 15 MG/1
15 TABLET ORAL DAILY PRN
Qty: 30 TABLET | Refills: 0 | Status: SHIPPED | OUTPATIENT
Start: 2021-02-02

## 2021-02-03 ENCOUNTER — HOSPITAL ENCOUNTER (OUTPATIENT)
Dept: PHYSICAL THERAPY | Facility: CLINIC | Age: 36
Setting detail: THERAPIES SERIES
Discharge: HOME | End: 2021-02-03
Payer: MEDICARE

## 2021-02-03 PROCEDURE — 97110 THERAPEUTIC EXERCISES: CPT

## 2021-02-03 NOTE — FLOWSHEET NOTE
[x] THE [facility] &  Therapy  [facility]   [place]: [phone]  F: [phone]      Physical Therapy Daily Treatment Note    Date:  2/3/2021  Patient Name:  Suresh Miller    :  1985  MRN: [id number]  Physician: MAGNOLIA Ley                      Insurance: [place] Advantage 30 v  Medical Diagnosis: LLE ACL and meniscus repair             Rehab Codes: M25.562  Onset date: 20                   Next 's appt. : 21  Visit# / total visits:      Cancels/No Shows: 0    Subjective:    Pain:  [x] Yes  [] No Location: LLE Knee   Pain Rating: (0-10 scale) 2/10  Pain altered Tx:  [x] No  [] Yes  Action:  Comments:     Objective:   Precautions:  WBAT  Exercises: Bolded completed 21      Reps/ Time Weight/ Level Comments    Bike 10'   x   Treadmill 5'  Gait train            L only step calf stetch 2'   x   HS S 3x30\"   x   gastroc stretch end of session 3x30\"             Balance board 3' L3     4-way hip x20 purple  x   TKE 20x3\" Purple     Rev TKE 10x10\" Purple     Heel raises 20x      TGym L LE supine squat/ HR 2x10 L22  x   Step ups- fwd/lat x15 12\"/8\"  x   Step down 2x10 4\"     Heel taps 2x10 4\" 3 direction x   Lunges x10  Front/45/lat    Star slides x20   x   Hovers x30 12\"step  x   BOSU squat with med ball toss x30 Blue med ball // bars x   SLS on foam rebounder toss 30x Yellow ball, black foam     Step up with hip hike, eccentric lowering 3x10 12\"     Monster walk 2 laps ea Blanton ankles fwd/lateral x   SL eccentric squat  x20 To the 12\"step + foam  x   DL deadlift x20 No weight Max cueing to perform correctly x   Prone Hang 5' 5#     Self knee flexion stretch on step 10x10\"   x   [place] split squat 3x10  1st set with UE assist    Split squat isometric 3x30\"    Both feet on floor    Split squat isotonic 3x10   x   Other:   Manual: prn    Treatment Charges: Mins Units   []  Modalities     [x]  Ther Exercise 40 3   []  Manual Therapy []  Ther Activities     []  Aquatics     []  Vasocompression     []  Other Neuro     Total Treatment time 40 3       Assessment: [x] Progressing toward goals. [x] Other: Continued SL strength progressions with focus on eccentric quad control. Continued soreness noted throughout treatment. Stressed importance of HEP and continued focus on quad strengthening. STG/LTG:  STG: (to be met in 10 treatments)  1. ? Pain: Decrease pain levels to 3/10   2. ? ROM: Increase flexibility and AROM limitations throughout to equal bilat to reduce difficulty with ADLs  3. ? Strength: Increase LLE hip and knee strength to 5/5  4. Independent with Home Exercise Programs     LTG: (to be met in 20 treatments)  1. Improve score on assessment tool LEFS from 10/80 to 60/80 or better  2. Reduce pain levels to 0/10  3. Pt able to walk normal and perform stairs normally without valgus collapse to allow him to return to normal daily function without restriction      Pt. Education:  [x] Yes  [] No  [x] Reviewed Prior HEP/Ed  Method of Education: [x] Verbal  [] Demo  [] Written  Comprehension of Education:  [x] Verbalizes understanding. [x] Demonstrates understanding. [x] Needs review. [] Demonstrates/verbalizes HEP/Ed previously given. Plan: [x] Continue current frequency toward long and short term goals. [x] Specific Instructions for subsequent treatments: continue strength and ROM progressions.        Time In: 1500      Time Out: 1600    Electronically signed by:  Matt Gonzalez PTA

## 2021-02-08 ENCOUNTER — HOSPITAL ENCOUNTER (OUTPATIENT)
Dept: PHYSICAL THERAPY | Facility: CLINIC | Age: 36
Setting detail: THERAPIES SERIES
Discharge: HOME | End: 2021-02-08
Payer: MEDICARE

## 2021-02-08 PROCEDURE — 97110 THERAPEUTIC EXERCISES: CPT

## 2021-02-08 NOTE — FLOWSHEET NOTE
[x] THE [facility] &  Therapy  [facility]   [place]: [phone]  F: [phone]      Physical Therapy Daily Treatment Note    Date:  2021  Patient Name:  Treva Galeazzi    :  1985  MRN: [id number]  Physician: MAGNOLIA Mills                      Insurance: [place] Advantage 30 v  Medical Diagnosis: LLE ACL and meniscus repair             Rehab Codes: M25.562  Onset date: 20                   Next 's appt. : 21  Visit# / total visits: 10/30     Cancels/No Shows: 0    Subjective:    Pain:  [x] Yes  [] No Location: LLE Knee   Pain Rating: (0-10 scale) 2/10  Pain altered Tx:  [x] No  [] Yes  Action:  Comments: Feel like I'm getting stronger.    Objective:   Precautions:  WBAT  Exercises: Bolded completed 21      Reps/ Time Weight/ Level Comments Completed   Bike 10'   x   Treadmill 5'  Gait train            L only step calf stetch 2'   x   HS S 3x30\"   x   gastroc stretch end of session 3x30\"             Balance board 3' L3     4-way hip x20 purple  x   TKE 20x3\" Purple     Rev TKE 10x10\" Purple     Heel raises 20x      TGym L LE supine squat/ HR 2x10 L22     Step ups- fwd/lat x15 12\"/8\"     Step down 2x10 4\"     Heel taps 2x10 6\" 3 direction x   Dynamic Lunge       Lateral Lunges 2x10   x   Star slides x20   x   Hovers x30 12\"step  x   BOSU squat with med ball toss x30 Blue med ball  x   SLS on BOSU rebounder toss 30x Yellow ball  x   Powerstride 3x10 12\"  x   Monster walk 2 laps ea Blanton ankles fwd/lateral x   SL eccentric squat  x20 To the 12\"step + foam  x   DL deadlift x20 Purple band under foot  Max cueing to perform correctly x   Curtsy Deadlift 2x10 10#  x   Self knee flexion stretch on step 10x10\"      [place] split squat 3x10  Cue for no transverse plane deviation x   Split squat isometric 3x30\"    Both feet on floor    Split squat isotonic 3x10      Other:   Manual: prn    Treatment Charges: Mins Units   []  Modalities [x]  Ther Exercise 45 3   []  Manual Therapy     []  Ther Activities     []  Aquatics     []  Vasocompression     []  Other Neuro     Total Treatment time 45 3       Assessment: [x] Progressing toward goals. Pt is starting to come around with regard to L LE strength. Good effort this visit although still late to appt. Able to increase to 6 inch step for heel taps. [x] Other: Continued SL strength progressions with focus on eccentric quad control. Continued soreness noted throughout treatment. Stressed importance of HEP and continued focus on quad strengthening. STG/LTG:  STG: (to be met in 10 treatments)  1. ? Pain: Decrease pain levels to 3/10   2. ? ROM: Increase flexibility and AROM limitations throughout to equal bilat to reduce difficulty with ADLs  3. ? Strength: Increase LLE hip and knee strength to 5/5  4. Independent with Home Exercise Programs     LTG: (to be met in 20 treatments)  1. Improve score on assessment tool LEFS from 10/80 to 60/80 or better  2. Reduce pain levels to 0/10  3. Pt able to walk normal and perform stairs normally without valgus collapse to allow him to return to normal daily function without restriction      Pt. Education:  [x] Yes  [] No  [x] Reviewed Prior HEP/Ed  Method of Education: [x] Verbal  [] Demo  [] Written  Comprehension of Education:  [x] Verbalizes understanding. [x] Demonstrates understanding. [x] Needs review. [] Demonstrates/verbalizes HEP/Ed previously given. Plan: [x] Continue current frequency toward long and short term goals. [x] Specific Instructions for subsequent treatments: continue strength and ROM progressions.        Time SN:8408    Time Out: 1000    Electronically signed by:  Jose Luis Benz, PT

## 2021-02-10 ENCOUNTER — HOSPITAL ENCOUNTER (OUTPATIENT)
Dept: PHYSICAL THERAPY | Facility: CLINIC | Age: 36
Setting detail: THERAPIES SERIES
Discharge: HOME | End: 2021-02-10
Payer: MEDICARE

## 2021-02-10 PROCEDURE — 97112 NEUROMUSCULAR REEDUCATION: CPT

## 2021-02-10 PROCEDURE — 97110 THERAPEUTIC EXERCISES: CPT

## 2021-02-10 NOTE — FLOWSHEET NOTE
[x] THE [facility] &  Therapy  [facility]   [place]: [phone]  F: [phone]      Physical Therapy Daily Treatment Note    Date:  2/10/2021  Patient Name:  Treva Galeazzi    :  1985  MRN: [id number]  Physician: MAGNOLIA Mills                      Insurance: [place] Advantage 30 v  Medical Diagnosis: LLE ACL and meniscus repair             Rehab Codes: M25.562  Onset date: 20                   Next 's appt. : 21  Visit# / total visits:      Cancels/No Shows: 0    Subjective:    Pain:  [x] Yes  [] No Location: LLE Knee   Pain Rating: (0-10 scale) 2/10  Pain altered Tx:  [x] No  [] Yes  Action:  Comments: Pt arrives noting he feels he is getting better. States he is still sore from [facility] session. Pt arrives on time today.      Objective:   Precautions:  WBAT  Exercises: Bolded completed 02/10/21      Reps/ Time Weight/ Level Comments Completed   Bike 10'   x   Treadmill 5'  Gait train            L only step calf stetch 2'   x   HS S 3x30\"   x   gastroc stretch end of session 3x30\"             Balance board 3' L3     4-way hip x20 purple  x   TKE 20x3\" Purple     Rev TKE 10x10\" Purple     Heel raises 20x      TGym L LE supine squat/ HR 2x10 L22     Step ups- fwd/lat 2x10 12\"/8\" 2nd set with 10# ea UE    Step down 2x10 4\"     Neuro: Heel taps 2x10 6\" 3 direction x   Dynamic Lunge       Lateral Lunges 2x10   x   Neuro: Star slides x20   x   Hovers x30 12\"step  x   Neuro: BOSU squat with med ball toss to rebounder x30 Blue med ball  x   Neuro: SLS on BOSU rebounder toss 30x Yellow ball  x   Powerstride 3x10 12\"  x   Monster walk 2 laps ea Blanton ankles fwd/lateral x   Neuro: SL eccentric squat  x20 To the 12\"step + foam  x   DL deadlift x20 Purple band under foot  Max cueing to perform correctly x   Curtsy Deadlift 2x10 10#  x   Self knee flexion stretch on step 10x10\"      [place] split squat 3x10  Cue for no transverse plane deviation Needs review. [] Demonstrates/verbalizes HEP/Ed previously given. Plan: [x] Continue current frequency toward long and short term goals. [x] Specific Instructions for subsequent treatments: continue strength and ROM progressions.        Time In:2:00 pm    Time Out: 3:04    Electronically signed by:  Mariela Ely PTA

## 2021-02-15 ENCOUNTER — HOSPITAL ENCOUNTER (OUTPATIENT)
Dept: PHYSICAL THERAPY | Facility: CLINIC | Age: 36
Setting detail: THERAPIES SERIES
Discharge: HOME | End: 2021-02-15
Payer: MEDICARE

## 2021-02-15 PROCEDURE — 97110 THERAPEUTIC EXERCISES: CPT

## 2021-02-15 PROCEDURE — 97112 NEUROMUSCULAR REEDUCATION: CPT

## 2021-02-15 NOTE — FLOWSHEET NOTE
perform correctly    Curtsy Deadlift 2x10 10#  x   Self knee flexion stretch on step 10x10\"      [place] split squat 3x10   x   Walking heel raises  30 ft x4 20# ea UE     Split squat isometric 3x30\"    Both feet on floor    Split squat isotonic 3x10      Other:   Manual: prn    Treatment Charges: Mins Units   []  Modalities     [x]  Ther Exercise 45 3   []  Manual Therapy     []  Ther Activities     []  Aquatics     []  Vasocompression     [x]  Other Neuro 10 1   Total Treatment time 55 4       Assessment: [x] Progressing toward goals. Continued with strength and stability progressions this date. Continues to require tactile feedback secondary to anterior translation of his R tibia during SLS squats, but improved trunk lean during split squats. Patient continues to require RLE assist to prevent LOB during SLS on BOSU ball. Fatigue present during session with patient requiring several rest breaks during exercise sets. No pain complaints during session. [x] Other: Continued SL strength progressions with focus on eccentric quad control. Continued soreness noted throughout treatment. Stressed importance of HEP and continued focus on quad strengthening. STG/LTG:  STG: (to be met in 10 treatments)  1. ? Pain: Decrease pain levels to 3/10   2. ? ROM: Increase flexibility and AROM limitations throughout to equal bilat to reduce difficulty with ADLs  3. ? Strength: Increase LLE hip and knee strength to 5/5  4. Independent with Home Exercise Programs    LTG: (to be met in 20 treatments)  1. Improve score on assessment tool LEFS from 10/80 to 60/80 or better  2. Reduce pain levels to 0/10  3. Pt able to walk normal and perform stairs normally without valgus collapse to allow him to return to normal daily function without restriction      Pt. Education:  [x] Yes  [] No  [x] Reviewed Prior HEP/Ed  Educated on exercises present in the above log.  Cues provided on LE alignment, especially during dynamic single leg stance exercises. Method of Education: [x] Verbal  [] Demo  [] Written  Comprehension of Education:  [x] Verbalizes understanding. [x] Demonstrates understanding. [x] Needs review. [] Demonstrates/verbalizes HEP/Ed previously given. Plan: [x] Continue current frequency toward long and short term goals. [x] Specific Instructions for subsequent treatments: continue strength and ROM progressions.        Time BY:0991    Time HTR:3327    Electronically signed by:  MAHIN Mendieta   This Documentation has been reviewed by Rene Anderson PT

## 2021-02-17 ENCOUNTER — HOSPITAL ENCOUNTER (OUTPATIENT)
Dept: PHYSICAL THERAPY | Facility: CLINIC | Age: 36
Setting detail: THERAPIES SERIES
Discharge: HOME | End: 2021-02-17
Payer: MEDICARE

## 2021-02-17 PROCEDURE — 97110 THERAPEUTIC EXERCISES: CPT

## 2021-02-17 PROCEDURE — 97112 NEUROMUSCULAR REEDUCATION: CPT

## 2021-02-17 NOTE — FLOWSHEET NOTE
[x] THE [facility] &  Therapy  [facility]   [place]: [phone]  F: [phone]      Physical Therapy Daily Treatment Note    Date:  2021  Patient Name:  Shivani Pina    :  1985  MRN: [id number]  Physician: MAGNOLIA Mckeon                      Insurance: [place] Advantage 30 v  Medical Diagnosis: LLE ACL and meniscus repair             Rehab Codes: M25.562  Onset date: 20                   Next 's appt. : 21  Visit# / total visits:      Cancels/No Shows: 0    Subjective:     Pain:  [x] Yes  [] No Location: LLE Knee   Pain Rating: (0-10 scale) 7/10 soreness. 2/10 pain  Pain altered Tx:  [x] No  [] Yes  Action:  Comments: Pt arrives noting soreness from shoveling snow yesterday. Objective:   Precautions:  WBAT  Exercises: Bolded completed 21      Reps/ Time Weight/ Level Comments Completed   Bike 10'   x   Treadmill 5'  Gait train            L only step calf stetch 2'   x   HS S 3x30\"   x   gastroc stretch end of session 3x30\"             Balance board 3' L4  x   4-way hip x20 purple  x   Neuro: TKE SL  3x10  Purple     Rev TKE 10x10\" Purple     Step ups- fwd/lat  2x10 12\" Holding 10# each UE  Forward w/ alt.  March   x   Neuro: Heel taps 2x10 6\" 3 direction x   Neuro: Star slides x20   x   Hovers 2x30 12\"step 2nd set holding 20# med ball x   Neuro: BOSU squat with med ball toss to rebounder x30 Blue med ball  x   Neuro: SLS on BOSU rebounder toss 30x Yellow ball  x   Neuro : SLS on foam rebounder toss laterally 30x      Monster walk 2 laps ea Blanton ankles fwd/lateral x   Neuro: SL eccentric squat  x20 To the 12\"step + foam  x   DL deadlift x20 Purple band under foot  Max cueing to perform correctly    Curtsy Deadlift 2x10 10#  x   Self knee flexion stretch on step 10x10\"      Neuro: [place] split squat 3x10 CKC L on foam   x   Standing firehydrants  3x10 ea  Purple     Split squat isometric 3x30\"    Both feet on floor Split squat isotonic 3x10      Other:   Manual: prn    Treatment Charges: Mins Units   []  Modalities     [x]  Ther Exercise 33 2   []  Manual Therapy     []  Ther Activities     []  Aquatics     []  Vasocompression     [x]  Other Neuro 30 2   Total Treatment time 63 4       Assessment: [x] Progressing toward goals. Pt with overall good tolerance to treatment. Required intermittent rest breaks due to muscle fatigue. Pt continues to present with balance deficits therefore progressed activities. Frequent cueing to focus on form and not depth and speed. Again decreased trunk lean observed however did require cueing to ensure core is engaged. [x] Other: Continued SL strength progressions with focus on eccentric quad control. Continued soreness noted throughout treatment. Stressed importance of HEP and continued focus on quad strengthening. STG/LTG:  STG: (to be met in 10 treatments)  1. ? Pain: Decrease pain levels to 3/10   2. ? ROM: Increase flexibility and AROM limitations throughout to equal bilat to reduce difficulty with ADLs  3. ? Strength: Increase LLE hip and knee strength to 5/5  4. Independent with Home Exercise Programs    LTG: (to be met in 20 treatments)  1. Improve score on assessment tool LEFS from 10/80 to 60/80 or better  2. Reduce pain levels to 0/10  3. Pt able to walk normal and perform stairs normally without valgus collapse to allow him to return to normal daily function without restriction      Pt. Education:  [x] Yes  [] No  [x] Reviewed Prior HEP/Ed  Educated on exercises present in the above log. Cues provided on LE alignment, especially during dynamic single leg stance exercises. Method of Education: [x] Verbal  [] Demo  [] Written  Comprehension of Education:  [x] Verbalizes understanding. [x] Demonstrates understanding. [x] Needs review. [] Demonstrates/verbalizes HEP/Ed previously given. Plan: [x] Continue current frequency toward long and short term goals.      [x] Specific Instructions for subsequent treatments: continue strength and ROM progressions.        Time In:02:00 pm   Time Out: 3:13 pm     Electronically signed by:  Jocelyn Leroy PTA

## 2021-02-22 ENCOUNTER — HOSPITAL ENCOUNTER (OUTPATIENT)
Dept: PHYSICAL THERAPY | Facility: CLINIC | Age: 36
Setting detail: THERAPIES SERIES
Discharge: HOME | End: 2021-02-22
Payer: MEDICARE

## 2021-02-22 PROCEDURE — 97110 THERAPEUTIC EXERCISES: CPT

## 2021-02-22 NOTE — FLOWSHEET NOTE
[x] THE [facility] &  Therapy  [facility]   [place]: [phone]  F: [phone]      Physical Therapy Daily Treatment Note    Date:  2021  Patient Name:  Meg Nguyen    :  1985  MRN: [id number]  Physician: MAGNOLIA Quintana                      Insurance: [place] Advantage 30 v  Medical Diagnosis: LLE ACL and meniscus repair             Rehab Codes: M25.562  Onset date: 20                   Next 's appt. : 21  Visit# / total visits:      Cancels/No Shows: 0    Subjective:     Pain:  [x] Yes  [] No Location: LLE Knee   Pain Rating: (0-10 scale) 7/10 soreness. 2/10 pain  Pain altered Tx:  [x] No  [] Yes  Action:   Comments:     Objective:   Precautions:  WBAT  Exercises: Bolded completed 21      Reps/ Time Weight/ Level Comments Completed   Bike 10'   x   Treadmill 5'  Gait train            L only step calf stetch 2'   x   HS S 3x30\"   x   gastroc stretch end of session 3x30\"             Balance board 3' L4  x   4-way hip x20 purple  x   Neuro: TKE SL  3x10  Purple     Rev TKE 10x10\" Purple     Step ups- fwd/lat  2x10 12\" Holding 10# each UE  Forward w/ alt.  March   x   Neuro: Heel taps 2x10 6\" 3 direction x   Neuro: Star slides x20   x   Hovers 2x30 12\"step 2nd set holding 20# med ball x   Neuro: BOSU squat with med ball toss to rebounder x30 Blue med ball  x   Neuro: SLS on BOSU rebounder toss 30x Yellow ball  x   Neuro : SLS on foam rebounder toss laterally 30x      Monster walk 2 laps ea Blanton ankles fwd/lateral x   Neuro: SL eccentric squat  x20 To the 12\"step + foam  x   DL deadlift x20 Purple band under foot  Max cueing to perform correctly    Curtsy Deadlift 2x10 10#  x   Self knee flexion stretch on step 10x10\"      Neuro: [place] split squat 3x10 CKC L on foam   x   Standing firehydrants  3x10 ea  Purple     Split squat isometric 3x30\"    Both feet on floor    Split squat isotonic 3x10      Other:   Manual: prn    Treatment Charges: Mins Units   []  Modalities     []  Ther Exercise 45 3   []  Manual Therapy     []  Ther Activities     []  Aquatics     []  Vasocompression     []  Other Neuro     Total Treatment time 45 3       Assessment: [x] Progressing toward goals. [x] Other: Continued SL strength and stability progressions this date. Patient showing improved quad control and better tolerance to program. Notes muscle fatigue and soreness with progressions with no complaint of pain. STG/LTG:  STG: (to be met in 10 treatments)  1. ? Pain: Decrease pain levels to 3/10   2. ? ROM: Increase flexibility and AROM limitations throughout to equal bilat to reduce difficulty with ADLs  3. ? Strength: Increase LLE hip and knee strength to 5/5  4. Independent with Home Exercise Programs    LTG: (to be met in 20 treatments)  1. Improve score on assessment tool LEFS from 10/80 to 60/80 or better  2. Reduce pain levels to 0/10  3. Pt able to walk normal and perform stairs normally without valgus collapse to allow him to return to normal daily function without restriction      Pt. Education:  [x] Yes  [] No  [x] Reviewed Prior HEP/Ed  Educated on exercises present in the above log. Cues provided on LE alignment, especially during dynamic single leg stance exercises. Method of Education: [x] Verbal  [] Demo  [] Written  Comprehension of Education:  [x] Verbalizes understanding. [x] Demonstrates understanding. [x] Needs review. [] Demonstrates/verbalizes HEP/Ed previously given. Plan: [x] Continue current frequency toward long and short term goals. [x] Specific Instructions for subsequent treatments: continue strength and ROM progressions.        Time In: 0900     Time Out: 1000    Electronically signed by:  Christoph Pederson PTA

## 2021-02-24 ENCOUNTER — HOSPITAL ENCOUNTER (OUTPATIENT)
Dept: PHYSICAL THERAPY | Facility: CLINIC | Age: 36
Setting detail: THERAPIES SERIES
Discharge: HOME | End: 2021-02-24
Payer: MEDICARE

## 2021-02-24 PROCEDURE — 97110 THERAPEUTIC EXERCISES: CPT

## 2021-02-24 PROCEDURE — 97112 NEUROMUSCULAR REEDUCATION: CPT

## 2021-02-24 NOTE — FLOWSHEET NOTE
[x] THE [facility] &  Therapy  [facility]   [place]: [phone]  F: [phone]      Physical Therapy Daily Treatment Note    Date:  2021  Patient Name:  Dennis Mederos    :  1985  MRN: [id number]  Physician: MAGNOLIA Morales                      Insurance: [place] Advantage 30 [employer]  Medical Diagnosis: LLE ACL and meniscus repair             Rehab Codes: M25.562  Onset date: 20                   Next Dr's appt. : 21  Visit# / total visits: 15/30     Cancels/No Shows: 0    Subjective:     Pain:  [x] Yes  [] No Location: LLE Knee   Pain Rating: (0-10 scale) 4/10 soreness. 4/10 pain  Pain altered Tx:  [x] No  [] Yes  Action:   Comments: Pt arrives with increased antalgic gait. Reports he fell this morning. L foot planted bottom step right foot slipped and he went down into SL squat to the ground. Pt then worked 5 hours after. Pt notes he did not feel a pop or twist.     Objective:   Precautions:  WBAT  Exercises: Bolded completed 21      Reps/ Time Weight/ Level Comments Completed   Bike 10'   x   Treadmill 5'  Gait train            L only step calf stetch 2'   x   HS S 3x30\"   x    3x30\"      gastroc stretch end of session       Balance board 5' L4  x   4-way hip x20 purple  x   Neuro: TKE SL  3x10  Purple     Rev TKE 10x10\" Purple     Step ups- fwd/lat  2x10 12\" Holding 10# each UE  Forward w/ alt.  March   x   Neuro: Heel taps 2x10 6\" 3 direction x   Neuro: Star slides x20   x   Hovers 2x30 12\"step 2nd set holding 20# med ball x   Neuro: BOSU squat with med ball toss to rebounder x30 Blue med ball  x   Neuro : SLS on foam rebounder toss laterally 30x      Monster walk 2 laps ea Blanton ankles fwd/lateral x   Neuro: SL eccentric squat  x20 To the 12\"step + foam  x   DL deadlift x20 Purple band under foot  Max cueing to perform correctly    Curtsy Deadlift 2x10 10#  x   Self knee flexion stretch on step 10x10\"      Neuro: [place] split squat 3x10 CKC L on foam   x   Standing firehydrants  3x10 ea  Purple     Split squat isometric 3x30\"    Both feet on floor    Split squat isotonic 3x10      Other:   Manual: prn    Treatment Charges: Mins Units   []  Modalities     []  Ther Exercise 30 2   []  Manual Therapy     []  Ther Activities     []  Aquatics     []  Vasocompression     []  Other Neuro 20 1   Total Treatment time 45 3       Assessment: [x] Progressing toward goals. [x] Other: Decreased amount of exercises completed this date due to increased pain and soreness. Lachman's and anterior drawer negative for translation however does report pain. Pt with decreased quad control. Required cueing to improve form through out session. Again fatigue noted. Offered vasocompression to address pain and soreness however patient declined. STG/LTG:  STG: (to be met in 10 treatments)  1. ? Pain: Decrease pain levels to 3/10   2. ? ROM: Increase flexibility and AROM limitations throughout to equal bilat to reduce difficulty with ADLs  3. ? Strength: Increase LLE hip and knee strength to 5/5  4. Independent with Home Exercise Programs    LTG: (to be met in 20 treatments)  1. Improve score on assessment tool LEFS from 10/80 to 60/80 or better  2. Reduce pain levels to 0/10  3. Pt able to walk normal and perform stairs normally without valgus collapse to allow him to return to normal daily function without restriction      Pt. Education:  [x] Yes  [] No  [x] Reviewed Prior HEP/Ed  Educated on exercises present in the above log. Cues provided on LE alignment, especially during dynamic single leg stance exercises. Method of Education: [x] Verbal  [] Demo  [] Written  Comprehension of Education:  [x] Verbalizes understanding. [x] Demonstrates understanding. [x] Needs review. [] Demonstrates/verbalizes HEP/Ed previously given. Plan: [x] Continue current frequency toward long and short term goals.      [x] Specific Instructions for subsequent treatments: continue strength and ROM progressions.        Time In: 0205    Time Out: 305    Electronically signed by:  Renuka Jensen PTA

## 2021-03-01 ENCOUNTER — HOSPITAL ENCOUNTER (OUTPATIENT)
Dept: PHYSICAL THERAPY | Facility: CLINIC | Age: 36
Setting detail: THERAPIES SERIES
Discharge: HOME | End: 2021-03-01
Payer: MEDICARE

## 2021-03-01 PROCEDURE — 97110 THERAPEUTIC EXERCISES: CPT

## 2021-03-01 NOTE — FLOWSHEET NOTE
[x] THE [facility] &  Therapy  [facility]   [place]: [phone]  F: [phone]      Physical Therapy Daily Treatment Note    Date:  3/1/2021  Patient Name:  Shivani Pina    :  1985  MRN: [id number]  Physician: MAGNOLIA Mckeon                      Insurance: [place] Ad[employer]age 30 [employer]  Medical Diagnosis: LLE ACL and meniscus repair             Rehab Codes: M25.562  Onset date: 20                   Next 's appt. : 21  Visit# / total visits:      Cancels/No Shows: 0    Subjective:     Pain:  [x] Yes  [] No Location: LLE Knee   Pain Rating: (0-10 scale) 4/10 soreness. 4/10 pain  Pain altered Tx:  [x] No  [] Yes  Action:   Comments: Pt reports   Objective:   Precautions:  WBAT  Exercises:       Reps/ Time Weight/ Level Comments Completed   Bike 10'   x   Treadmill 5'  Gait train            L only step calf stetch 2'   x   HS S 3x30\"   x          gastroc stretch end of session       Balance board 5' L4     4-way hip x20 purple     Neuro: TKE SL  3x10  Purple     Rev TKE 10x10\" Purple     Step ups- fwd/lat  2x10 12\" Holding 10# each UE  Forward w/ alt.  March      Neuro: Heel taps 2x10 6\" 3 direction x   Neuro: Star slides x20   x   Hovers 2x30 12\"step  x   MOBO SL stand rebounder toss x30 Red med ball Fins 2/4 x   Neuro : SLS on foam rebounder toss laterally 30x      Monster walk 2 laps ea Blanton ankles fwd/lateral x   Neuro: SL eccentric squat  x20 To the 12\"step + foam     DL deadlift x20 Purple band under foot  Max cueing to perform correctly    Curtsy Deadlift 2x10 10#  x   Self knee flexion stretch on step 10x10\"      Neuro: [place] split squat 3x10   x   Power stride w/HR 2x10 12\"  x   Lunge matrix x5 ea     x   Dynamic lunge 2 laps   x   Other:   Manual: prn    Treatment Charges: Mins Units   []  Modalities     []  Ther Exercise 40 3   []  Manual Therapy     []  Ther Activities     []  Aquatics     []  Vasocompression     []  Other Total Treatment time 40 3       Assessment: [x] Progressing toward goals. Pt is not swollen this date  And is able to progress through program with no residual deficits noted from fall at home that pt reported last week. Pt is doing better with quad control but still requires cue to unlock hip and utilize glutes to improve knee control. Still demonstrates lack of control in transverse plane that causes genu valgus. STG/LTG:  STG: (to be met in 10 treatments)  1. ? Pain: Decrease pain levels to 3/10   2. ? ROM: Increase flexibility and AROM limitations throughout to equal bilat to reduce difficulty with ADLs  3. ? Strength: Increase LLE hip and knee strength to 5/5  4. Independent with Home Exercise Programs    LTG: (to be met in 20 treatments)  1. Improve score on assessment tool LEFS from 10/80 to 60/80 or better  2. Reduce pain levels to 0/10  3. Pt able to walk normal and perform stairs normally without valgus collapse to allow him to return to normal daily function without restriction      Pt. Education:  [x] Yes  [] No  [x] Reviewed Prior HEP/Ed  Educated on exercises present in the above log. Cues provided on LE alignment, especially during dynamic single leg stance exercises. Method of Education: [x] Verbal  [] Demo  [] Written  Comprehension of Education:  [x] Verbalizes understanding. [x] Demonstrates understanding. [x] Needs review. [] Demonstrates/verbalizes HEP/Ed previously given. Plan: [x] Continue current frequency toward long and short term goals. [x] Specific Instructions for subsequent treatments: continue strength and ROM progressions.        Time In: 0910   Time Out: 1000    Electronically signed by:  Jeffrey Villa PT

## 2021-03-02 NOTE — PROGRESS NOTES
[facility]  [address]  [facility] [address]  Dept: [phone]  Dept Fax: [phone]        Post Operative Follow Up    Subjective:     Chief Complaint   Patient presents with    Knee Pain     Patient is 5 months s/p L knee ACL reconstruction [facility] 9/22/20)     Post Op Surgery:     The patient is here for a follow up after having a left knee arthroscopy with medial meniscal repair and ACL reconstruction. The date of surgery was on 09/22/2020. Therefore we are 5.5 months post op. Currently the patient's pain is controlled with Meloxicam.  Physical therapy was started and he states that he still attends twice a week. Patient presents today with no ambulatory devices. Patient states they are doing well and he has been back to work since December and he states that he works 12 hours shifts now and he works 6 days a week. He states it is getting stronger. He fell last Wed on the ice. He states he does not feel he hurt his knee in the fall. Review of Systems   Constitutional: Positive for activity change. Negative for appetite change, fatigue and fever. Respiratory: Negative. Negative for apnea, cough, chest tightness and shortness of breath. Cardiovascular: Negative. Negative for chest pain, palpitations and leg swelling. Gastrointestinal: Negative for abdominal distention, abdominal pain, constipation, diarrhea, nausea and vomiting. Genitourinary: Negative for difficulty urinating, dysuria and hematuria. Musculoskeletal: Positive for arthralgias, gait problem and joint swelling. Negative for myalgias. Skin: Negative for color change and rash. Neurological: Negative for dizziness, weakness, numbness and headaches. Psychiatric/Behavioral: Negative for sleep disturbance. I have reviewed the CC, HPI, ROS, PMH, FHX, Social History, and if not present in this note, I have reviewed in the patient's chart.  I agree with the documentation provided by other staff and have reviewed their documentation prior to providing my signature indicating agreement. Vitals:   /82   Pulse 83   Temp 97.9 °F (36.6 °C)   Resp 13   Ht 5' 11\" (1.803 m)   Wt 167 lb (75.8 kg)   BMI 23.29 kg/m²  Body mass index is 23.29 kg/m². Physical Examination:     Orthopedics:    GENERAL: Alert and oriented X3 in no acute distress. SKIN: Intact without lesions or ulcerations. Incision is well healed with no signs of infection. NEURO: Intact to sensory and motor testing. VASC: Capillary refill is less than 3 seconds. KNEE LEFT  GEN:  Alert and oriented X 3, in no acute distress. GAIT:  The patient's gait was observed while entering the exam room and was noted to be antalgic. The extremity is in anatomic alignment. SKIN:  Intact without rashes, lesions, or ulcerations. No obvious deformity or swelling. NEURO:  The patient responds to light touch throughout bilateral LE. Patellar and Achilles reflexes are 2/4. VASC:  The bilateral LE is neurovascularly intact with  2/4 DP and  2/4 PT pulses. Brisk capillary refill. ROM: 3/132.  no knee effusion   MUSC:   slightly decreased quad tone  LIGAMENT:  Lachman's test is Negative with Good endpoint. Anterior drawer Negative. Posterior drawer Negative. There is  No varus instability at 0 degrees and No varus instability at 30 degrees. There is No valgus instability at 0 degrees and No valgus instability at 30 degrees. SPECIAL:  Yohannes test reveals no clunks, no crepitation, no pain. Procedures:     Procedure:  No    Radiology:   No results found. Assessment:     1. S/P ACL reconstruction      Plan:   Post Op Treatment : Patient had the treatment regimen reviewed today 5-1/2 months status post left knee arthroscopy with ACL reconstruction and medial meniscal repair. We discussed some of the etiologies and natural histories of recovery after an ACL reconstruction.  We also discussed the

## 2021-03-03 ENCOUNTER — HOSPITAL ENCOUNTER (OUTPATIENT)
Dept: PHYSICAL THERAPY | Facility: CLINIC | Age: 36
Setting detail: THERAPIES SERIES
Discharge: HOME | End: 2021-03-03
Payer: MEDICARE

## 2021-03-03 ENCOUNTER — OFFICE VISIT (OUTPATIENT)
Dept: ORTHOPEDIC SURGERY | Age: 36
End: 2021-03-03
Payer: MEDICARE

## 2021-03-03 VITALS
BODY MASS INDEX: 23.38 KG/M2 | RESPIRATION RATE: 13 BRPM | TEMPERATURE: 97.9 F | SYSTOLIC BLOOD PRESSURE: 129 MMHG | DIASTOLIC BLOOD PRESSURE: 82 MMHG | HEIGHT: 71 IN | HEART RATE: 83 BPM | WEIGHT: 167 LBS

## 2021-03-03 DIAGNOSIS — Z98.890 S/P ACL RECONSTRUCTION: Primary | ICD-10-CM

## 2021-03-03 PROCEDURE — G8420 CALC BMI NORM PARAMETERS: HCPCS | Performed by: PHYSICIAN ASSISTANT

## 2021-03-03 PROCEDURE — 4004F PT TOBACCO SCREEN RCVD TLK: CPT | Performed by: PHYSICIAN ASSISTANT

## 2021-03-03 PROCEDURE — 99212 OFFICE O/P EST SF 10 MIN: CPT | Performed by: PHYSICIAN ASSISTANT

## 2021-03-03 PROCEDURE — G8484 FLU IMMUNIZE NO ADMIN: HCPCS | Performed by: PHYSICIAN ASSISTANT

## 2021-03-03 PROCEDURE — 97110 THERAPEUTIC EXERCISES: CPT

## 2021-03-03 PROCEDURE — G8427 DOCREV CUR MEDS BY ELIG CLIN: HCPCS | Performed by: PHYSICIAN ASSISTANT

## 2021-03-03 ASSESSMENT — ENCOUNTER SYMPTOMS
DIARRHEA: 0
CHEST TIGHTNESS: 0
VOMITING: 0
ABDOMINAL PAIN: 0
COLOR CHANGE: 0
CONSTIPATION: 0
RESPIRATORY NEGATIVE: 1
NAUSEA: 0
ABDOMINAL DISTENTION: 0
APNEA: 0
SHORTNESS OF BREATH: 0
COUGH: 0

## 2021-03-03 NOTE — FLOWSHEET NOTE
difficulty with ADLs  3. ? Strength: Increase LLE hip and knee strength to 5/5  4. Independent with Home Exercise Programs    LTG: (to be met in 20 treatments)  1. Improve score on assessment tool LEFS from 10/80 to 60/80 or better  2. Reduce pain levels to 0/10  3. Pt able to walk normal and perform stairs normally without valgus collapse to allow him to return to normal daily function without restriction      Pt. Education:  [x] Yes  [] No  [x] Reviewed Prior HEP/Ed  Educated on exercises present in the above log. Cues provided on LE alignment, especially during dynamic single leg stance exercises. Method of Education: [x] Verbal  [] Demo  [] Written  Comprehension of Education:  [x] Verbalizes understanding. [x] Demonstrates understanding. [x] Needs review. [] Demonstrates/verbalizes HEP/Ed previously given. Plan: [x] Continue current frequency toward long and short term goals. [x] Specific Instructions for subsequent treatments: continue strength and ROM progressions.        Time In: 2:00pm    Time Out: 2:50pm    Electronically signed by:  Meme Timmons PTA

## 2021-03-08 ENCOUNTER — HOSPITAL ENCOUNTER (OUTPATIENT)
Dept: PHYSICAL THERAPY | Facility: CLINIC | Age: 36
Setting detail: THERAPIES SERIES
End: 2021-03-08
Payer: MEDICARE

## 2021-03-09 DIAGNOSIS — Z98.890 S/P ACL RECONSTRUCTION: Primary | ICD-10-CM

## 2021-03-10 ENCOUNTER — HOSPITAL ENCOUNTER (OUTPATIENT)
Dept: PHYSICAL THERAPY | Facility: CLINIC | Age: 36
Setting detail: THERAPIES SERIES
Discharge: HOME | End: 2021-03-10
Payer: MEDICARE

## (undated) DEVICE — TUBING FLD MGMT Y DBL SPIK DUALWAVE

## (undated) DEVICE — [AGGRESSIVE PLUS CUTTER, ARTHROSCOPIC SHAVER BLADE,  DO NOT RESTERILIZE,  DO NOT USE IF PACKAGE IS DAMAGED,  KEEP DRY,  KEEP AWAY FROM SUNLIGHT]: Brand: FORMULA

## (undated) DEVICE — PENCIL ES L3M BTTN SWCH HOLSTER W/ BLDE ELECTRD EDGE

## (undated) DEVICE — TUBING, SUCTION, 1/4" X 12', STRAIGHT: Brand: MEDLINE

## (undated) DEVICE — BIT DRL DIA4.5MM FULL THICKNESS CANN W/ CALIB DEPTH MRK RMR

## (undated) DEVICE — WAX SURG 2.5GM HEMSTAT BNE BEESWAX PARAFFIN ISO PALMITATE

## (undated) DEVICE — INTENDED FOR TISSUE SEPARATION, AND OTHER PROCEDURES THAT REQUIRE A SHARP SURGICAL BLADE TO PUNCTURE OR CUT.: Brand: BARD-PARKER ® CARBON RIB-BACK BLADES

## (undated) DEVICE — DRAPE SURG EQUIP W102XL51CM E CIR SEWN BND BG

## (undated) DEVICE — COVER LT HNDL BLU PLAS

## (undated) DEVICE — APPLICATOR MEDICATED 26 CC SOLUTION HI LT ORNG CHLORAPREP

## (undated) DEVICE — DISC SUCT FLR DLX QUICKSUITE

## (undated) DEVICE — SYRINGE IRRIG 60ML SFT PLIABLE BLB EZ TO GRP 1 HND USE W/

## (undated) DEVICE — BNDG,ELSTC,MATRIX,STRL,6"X5YD,LF,HOOK&LP: Brand: MEDLINE

## (undated) DEVICE — TUBING PMP L16FT MAIN DISP FOR AR-6400 AR-6475

## (undated) DEVICE — COVER,TABLE,HEAVY DUTY,50"X90",STRL: Brand: MEDLINE

## (undated) DEVICE — DEVICE MENIS HALF PIPE CANN SLED JUGGER STIT DISP

## (undated) DEVICE — GOWN,AURORA,NONRNF,XL,30/CS: Brand: MEDLINE

## (undated) DEVICE — Z DISCONTINUED USE 2271144 DRAIN SURG W0.25XL18IN PRECUT UNIF WALL THICKNESS PENROSE

## (undated) DEVICE — KIT INSTR TRANSTIBIAL CRUCE W/O SAW BLDE DISP

## (undated) DEVICE — CANNULA ARTHSCP L3CM DIA12MM DBL DAM 1 PC MOLD LO PROF FLNG

## (undated) DEVICE — SUPER TURBOVAC 90 INTEGRATED CABLE WAND ICW: Brand: COBLATION

## (undated) DEVICE — Z DUP USE 2650846 BRACE KNEE UNIV L46 65CM THGH 76CM LTWT EZ TO USE HNG EZ TO

## (undated) DEVICE — CUTTER SUTURE MENIS REP DEV JUGGERSTITCH DISP

## (undated) DEVICE — [STANDARD 12-FLUTE BARREL BUR, ARTHROSCOPIC SHAVER BLADE,  DO NOT RESTERILIZE,  DO NOT USE IF PACKAGE IS DAMAGED,  KEEP DRY,  KEEP AWAY FROM SUNLIGHT]: Brand: FORMULA

## (undated) DEVICE — PREP-RESISTANT MARKER W/ RULER: Brand: MEDLINE INDUSTRIES, INC.

## (undated) DEVICE — POUCH INSTR W6.75XL11.5IN FRST 2 PKT ADH FOR ORTH AND

## (undated) DEVICE — SUTURE ETHLN SZ 3-0 L18IN NONABSORBABLE BLK PS-2 L19MM 3/8 1669H